# Patient Record
Sex: FEMALE | Race: WHITE | Employment: OTHER | URBAN - METROPOLITAN AREA
[De-identification: names, ages, dates, MRNs, and addresses within clinical notes are randomized per-mention and may not be internally consistent; named-entity substitution may affect disease eponyms.]

---

## 2017-04-18 ENCOUNTER — HOSPITAL ENCOUNTER (OUTPATIENT)
Dept: GENERAL RADIOLOGY | Age: 67
Discharge: HOME | End: 2017-04-18
Attending: ORTHOPAEDIC SURGERY
Payer: COMMERCIAL

## 2017-04-18 DIAGNOSIS — M79.641 PAIN IN RIGHT HAND: ICD-10-CM

## 2017-04-18 PROCEDURE — 73130 X-RAY EXAM OF HAND: CPT

## 2017-04-27 ENCOUNTER — OFFICE VISIT (OUTPATIENT)
Dept: SURGERY | Facility: CLINIC | Age: 67
End: 2017-04-27

## 2017-04-27 DIAGNOSIS — M19.041 PRIMARY OSTEOARTHRITIS OF RIGHT HAND: Primary | ICD-10-CM

## 2017-04-27 PROCEDURE — 99212 OFFICE O/P EST SF 10 MIN: CPT | Performed by: PLASTIC SURGERY

## 2017-04-27 PROCEDURE — 99243 OFF/OP CNSLTJ NEW/EST LOW 30: CPT | Performed by: PLASTIC SURGERY

## 2017-04-27 NOTE — H&P
Virgen Majano is a 73year old female that presents with Patient presents with:  Pain: R Index Finger MCP joint  .     REFERRED BY:  Bill Patricia      Pacemaker: No  Latex Allergy: no  Coumadin: No  Plavix: No  Other anticoagulants: No  Cardiac stents: No every other day.    Disp:  Rfl:        Allergies:     Ambien [Zolpidem]       Insomnia  Antihistamine [Diph*    Insomnia  Dalmane [Flurazepam]    Other (See Comments)    Comment:Severe headaches  Decongestant [Oxyme*    Jittery  Duricef [Cefadroxil]    Itch Drug Use: Not on file    Sexual Activity: Not on file   Not on file  Other Topics Concern    Caffeine Concern Yes    Comment: 4 cups coffee daily    Left Handed No    Right Handed Yes    Currently spends a great deal of time in the sun No    History of tan the patient wishes to proceed with treatment. We cannot place her on anti-inflammatories because of all her allergies. We will start therapy including range of motion and paraffin.     If symptoms are not relieved, I would recommend the patient see a rh

## 2017-05-09 ENCOUNTER — OFFICE VISIT (OUTPATIENT)
Dept: SURGERY | Facility: CLINIC | Age: 67
End: 2017-05-09

## 2017-05-09 DIAGNOSIS — M62.81 DISTAL MUSCLE WEAKNESS: ICD-10-CM

## 2017-05-09 DIAGNOSIS — M25.641 JOINT STIFFNESS OF HAND, RIGHT: Primary | ICD-10-CM

## 2017-05-09 PROCEDURE — 97165 OT EVAL LOW COMPLEX 30 MIN: CPT | Performed by: OCCUPATIONAL THERAPIST

## 2017-05-09 NOTE — PROGRESS NOTES
OCCUPATIONAL THERAPY EVALUATION:   Inez Lugo   [id number]       SUBJECTIVE:    HX of Injury: Progressive right index finger MCP arthritis. Chief Complaint:   RIF MCP pain with activity. .    Precautions: None  Premorbid Functional Status: Independent

## 2017-05-22 ENCOUNTER — HOSPITAL ENCOUNTER (OUTPATIENT)
Dept: MAMMOGRAPHY | Facility: HOSPITAL | Age: 67
Discharge: HOME | End: 2017-05-22
Attending: INTERNAL MEDICINE
Payer: COMMERCIAL

## 2017-05-22 DIAGNOSIS — C50.919 BREAST CANCER (HCC): ICD-10-CM

## 2017-05-22 PROCEDURE — 77066 DX MAMMO INCL CAD BI: CPT | Performed by: INTERNAL MEDICINE

## 2017-05-26 ENCOUNTER — TELEPHONE (OUTPATIENT)
Dept: HEMATOLOGY/ONCOLOGY | Facility: HOSPITAL | Age: 67
End: 2017-05-26

## 2017-05-26 ENCOUNTER — PATIENT MESSAGE (OUTPATIENT)
Dept: HEMATOLOGY/ONCOLOGY | Facility: HOSPITAL | Age: 67
End: 2017-05-26

## 2017-05-26 NOTE — PROGRESS NOTES
As instructed by Dr. Fátima Grimes, let pt know that mammo showed no evidence of malignancy, verified address, mailed copy.

## 2017-05-26 NOTE — TELEPHONE ENCOUNTER
From: Ekaterina Cain  To: Frandy Barker MD  Sent: 5/26/2017 12:06 PM CDT  Subject: Test Results Question    On Monday, May 22nd, I had my mammo done. Has Dr. Alexia Luna had a chance to review? If so, please send me a copy    of test results. Thank you.

## 2017-09-07 ENCOUNTER — OFFICE VISIT (OUTPATIENT)
Dept: HEMATOLOGY/ONCOLOGY | Facility: HOSPITAL | Age: 67
End: 2017-09-07
Attending: INTERNAL MEDICINE
Payer: MEDICARE

## 2017-09-07 VITALS
WEIGHT: 165 LBS | RESPIRATION RATE: 16 BRPM | SYSTOLIC BLOOD PRESSURE: 164 MMHG | HEART RATE: 69 BPM | HEIGHT: 69 IN | DIASTOLIC BLOOD PRESSURE: 74 MMHG | TEMPERATURE: 99 F | BODY MASS INDEX: 24.44 KG/M2

## 2017-09-07 DIAGNOSIS — Z51.81 ENCOUNTER FOR MONITORING ADJUVANT HORMONAL THERAPY: ICD-10-CM

## 2017-09-07 DIAGNOSIS — E28.39 FEMALE HYPOGONADISM DUE TO AROMATASE INHIBITOR THERAPY: ICD-10-CM

## 2017-09-07 DIAGNOSIS — T45.1X5A FEMALE HYPOGONADISM DUE TO AROMATASE INHIBITOR THERAPY: ICD-10-CM

## 2017-09-07 DIAGNOSIS — C50.411 MALIGNANT NEOPLASM OF UPPER-OUTER QUADRANT OF RIGHT BREAST IN FEMALE, ESTROGEN RECEPTOR POSITIVE (HCC): Primary | ICD-10-CM

## 2017-09-07 DIAGNOSIS — Z17.0 MALIGNANT NEOPLASM OF UPPER-OUTER QUADRANT OF RIGHT BREAST IN FEMALE, ESTROGEN RECEPTOR POSITIVE (HCC): Primary | ICD-10-CM

## 2017-09-07 DIAGNOSIS — Z79.899 ENCOUNTER FOR MONITORING ADJUVANT HORMONAL THERAPY: ICD-10-CM

## 2017-09-07 PROCEDURE — G0463 HOSPITAL OUTPT CLINIC VISIT: HCPCS | Performed by: INTERNAL MEDICINE

## 2017-09-07 PROCEDURE — 99214 OFFICE O/P EST MOD 30 MIN: CPT | Performed by: INTERNAL MEDICINE

## 2017-09-07 RX ORDER — ANASTROZOLE 1 MG/1
TABLET ORAL
Qty: 90 TABLET | Refills: 3 | Status: SHIPPED | OUTPATIENT
Start: 2017-09-07 | End: 2018-10-22

## 2017-09-10 DIAGNOSIS — Z17.0 MALIGNANT NEOPLASM OF UPPER-OUTER QUADRANT OF RIGHT BREAST IN FEMALE, ESTROGEN RECEPTOR POSITIVE (HCC): ICD-10-CM

## 2017-09-10 DIAGNOSIS — Z79.899 ENCOUNTER FOR MONITORING ADJUVANT HORMONAL THERAPY: ICD-10-CM

## 2017-09-10 DIAGNOSIS — Z51.81 ENCOUNTER FOR MONITORING ADJUVANT HORMONAL THERAPY: ICD-10-CM

## 2017-09-10 DIAGNOSIS — T45.1X5A FEMALE HYPOGONADISM DUE TO AROMATASE INHIBITOR THERAPY: ICD-10-CM

## 2017-09-10 DIAGNOSIS — E28.39 FEMALE HYPOGONADISM DUE TO AROMATASE INHIBITOR THERAPY: ICD-10-CM

## 2017-09-10 DIAGNOSIS — C50.411 MALIGNANT NEOPLASM OF UPPER-OUTER QUADRANT OF RIGHT BREAST IN FEMALE, ESTROGEN RECEPTOR POSITIVE (HCC): ICD-10-CM

## 2017-09-10 DIAGNOSIS — Z91.89 AT RISK FOR OSTEOPENIA DUE TO HISTORY OF OSTEOPOROSIS: Primary | ICD-10-CM

## 2017-09-11 NOTE — PROGRESS NOTES
RICK Harrison is a 62year old female with a history of a hormone receptor positive, Her 2 silvia negative, pT1c No Mo right breast cancer.   She was treated with a lumpectomy, and sentinel lymph node biopsy, followed by adriamycin and cytoxan x 4 cy Eyes: Positive for visual disturbance. Respiratory: Negative for cough and shortness of breath. Cardiovascular: Negative for chest pain and palpitations.    Gastrointestinal: Negative for abdominal distention, constipation, diarrhea, nausea and vomitin lumpectomy, axillary sampling, Adriamycin & Cytoxan   • Encounter for adjustment or management of vascular access device 2004    venous access; port removal   • Encounter for fitting of portacath 2003    venous access; portacath placement   • H/O arthrosc Constitutional: She is oriented to person, place, and time. She appears well-developed and well-nourished. No distress.    Wt Readings from Last 6 Encounters:  09/07/17 : 74.8 kg (165 lb)  10/05/16 : 75.3 kg (166 lb)  08/04/16 : 73.5 kg (162 lb)  07/01/16 : Patient does not have clinical evidence of local recurrence or metastatic disease. Her bilateral diagnostic mammogram 5/22/17 was negative. She will continue to have annual mammograms. She will continue hormonal therapy with anastrozole 1 mg daily.

## 2017-09-11 NOTE — PROGRESS NOTES
RICK Narvaez is a 62year old female with a history of a hormone receptor positive, Her 2 silvia negative, pT1c No Mo right breast cancer.   She was treated with a lumpectomy, and sentinel lymph node biopsy, followed by adriamycin and cytoxan x 4 cyc Gastrointestinal: Negative for abdominal distention, constipation, diarrhea, nausea and vomiting. Genitourinary: Negative for dysuria and hematuria. Musculoskeletal: Positive for arthralgias.         Knees get tired after playing with grandchildren   Sk venous access; port removal   • Encounter for fitting of portacath 2003    venous access; portacath placement   • H/O arthroscopy 1992    RT wrist   • Removal of pin, plate, miriam, or screw 1992    removal of screws   • S/P T&A (status post tonsillectomy an She is trying to lose weight with a change to a healthier diet and exercise by walking   HENT:   Head: Normocephalic and atraumatic. Mouth/Throat: No oropharyngeal exudate.    Voice unchanged, but hoarse   Eyes: Conjunctivae and EOM are normal. Pupils are She is at increased risk for ongoing loss of bone density with hormonal therapy. The study 9/4/15 was normal.  She is encouraged to continue her weight bearing exercise and healthy diet.         30 total minutes spent with patient >50% of visit was spent i

## 2018-05-24 ENCOUNTER — TELEPHONE (OUTPATIENT)
Dept: HEMATOLOGY/ONCOLOGY | Facility: HOSPITAL | Age: 68
End: 2018-05-24

## 2018-05-24 DIAGNOSIS — Z85.3 PERSONAL HISTORY OF BREAST CANCER: Primary | ICD-10-CM

## 2018-05-24 DIAGNOSIS — Z12.39 SCREENING FOR BREAST CANCER: ICD-10-CM

## 2018-05-24 NOTE — TELEPHONE ENCOUNTER
Geovany Pretty calling asking for Mammo order. Please contact her to let her know order has been placed.  nadia

## 2018-06-19 ENCOUNTER — HOSPITAL ENCOUNTER (OUTPATIENT)
Dept: MAMMOGRAPHY | Facility: HOSPITAL | Age: 68
Discharge: HOME | End: 2018-06-19
Attending: INTERNAL MEDICINE
Payer: MEDICARE

## 2018-06-19 ENCOUNTER — HOSPITAL ENCOUNTER (OUTPATIENT)
Dept: BONE DENSITY | Facility: HOSPITAL | Age: 68
Discharge: HOME | End: 2018-06-19
Attending: INTERNAL MEDICINE
Payer: MEDICARE

## 2018-06-19 DIAGNOSIS — Z85.3 PERSONAL HISTORY OF BREAST CANCER: ICD-10-CM

## 2018-06-19 DIAGNOSIS — Z12.39 SCREENING FOR BREAST CANCER: ICD-10-CM

## 2018-06-19 DIAGNOSIS — Z91.89 AT RISK FOR OSTEOPENIA DUE TO HISTORY OF OSTEOPOROSIS: ICD-10-CM

## 2018-06-19 PROCEDURE — 77063 BREAST TOMOSYNTHESIS BI: CPT | Performed by: INTERNAL MEDICINE

## 2018-06-19 PROCEDURE — 77080 DXA BONE DENSITY AXIAL: CPT | Performed by: INTERNAL MEDICINE

## 2018-06-19 PROCEDURE — 77067 SCR MAMMO BI INCL CAD: CPT | Performed by: INTERNAL MEDICINE

## 2018-06-26 ENCOUNTER — TELEPHONE (OUTPATIENT)
Dept: HEMATOLOGY/ONCOLOGY | Facility: HOSPITAL | Age: 68
End: 2018-06-26

## 2018-06-26 NOTE — TELEPHONE ENCOUNTER
Called patient last night and left a message on her machine. Called again now reviewing the test results of her mammogram and bone density study. Suggested that she call.

## 2018-06-26 NOTE — TELEPHONE ENCOUNTER
Birgit Borges called and said for today only please use this cell phone [phone], Also if you can release the bone density results to my chart

## 2018-06-28 ENCOUNTER — TELEPHONE (OUTPATIENT)
Dept: HEMATOLOGY/ONCOLOGY | Facility: HOSPITAL | Age: 68
End: 2018-06-28

## 2018-06-28 NOTE — TELEPHONE ENCOUNTER
Again no answer at home  Called the cell phone - and actually reached her   She will see Dr. Kimo Newell soon and have labs and evaluation

## 2018-08-23 ENCOUNTER — OFFICE VISIT (OUTPATIENT)
Dept: HEMATOLOGY/ONCOLOGY | Facility: HOSPITAL | Age: 68
End: 2018-08-23
Attending: INTERNAL MEDICINE
Payer: MEDICARE

## 2018-08-23 VITALS
HEIGHT: 69 IN | HEART RATE: 81 BPM | RESPIRATION RATE: 16 BRPM | DIASTOLIC BLOOD PRESSURE: 75 MMHG | WEIGHT: 165 LBS | SYSTOLIC BLOOD PRESSURE: 134 MMHG | TEMPERATURE: 98 F | BODY MASS INDEX: 24.44 KG/M2

## 2018-08-23 DIAGNOSIS — Z51.81 ENCOUNTER FOR MONITORING ADJUVANT HORMONAL THERAPY: ICD-10-CM

## 2018-08-23 DIAGNOSIS — Z12.39 SCREENING FOR BREAST CANCER: ICD-10-CM

## 2018-08-23 DIAGNOSIS — Z79.899 ENCOUNTER FOR MONITORING ADJUVANT HORMONAL THERAPY: ICD-10-CM

## 2018-08-23 DIAGNOSIS — C50.411 MALIGNANT NEOPLASM OF UPPER-OUTER QUADRANT OF RIGHT BREAST IN FEMALE, ESTROGEN RECEPTOR POSITIVE (HCC): Primary | ICD-10-CM

## 2018-08-23 DIAGNOSIS — Z85.3 PERSONAL HISTORY OF BREAST CANCER: ICD-10-CM

## 2018-08-23 DIAGNOSIS — Z17.0 MALIGNANT NEOPLASM OF UPPER-OUTER QUADRANT OF RIGHT BREAST IN FEMALE, ESTROGEN RECEPTOR POSITIVE (HCC): Primary | ICD-10-CM

## 2018-08-23 PROCEDURE — 99214 OFFICE O/P EST MOD 30 MIN: CPT | Performed by: INTERNAL MEDICINE

## 2018-08-24 NOTE — PROGRESS NOTES
RICK Bowser is a 62year old female with a history of a hormone receptor positive, Her 2 silvia negative, pT1c No Mo right breast cancer.   She was treated with a lumpectomy, and sentinel lymph node biopsy, followed by adriamycin and cytoxan x 4 cy Gastrointestinal: Negative for abdominal distention, constipation, diarrhea, nausea and vomiting. Genitourinary: Negative for dysuria and hematuria. Musculoskeletal: Positive for arthralgias.         Knees get tired    Skin: Negative for color change an venous access; port removal   • Encounter for fitting of portacath 2003    venous access; portacath placement   • H/O arthroscopy 1992    RT wrist   • Removal of pin, plate, miriam, or screw 1992    removal of screws   • S/P T&A (status post tonsillectomy an Constitutional: She is oriented to person, place, and time. She appears well-developed and well-nourished. No distress.    Wt Readings from Last 6 Encounters:  09/07/17 : 74.8 kg (165 lb)  10/05/16 : 75.3 kg (166 lb)  08/04/16 : 73.5 kg (162 lb)  07/01/16 : Patient does not have clinical evidence of local recurrence or metastatic disease. Her bilateral diagnostic mammogram 6/9/2018 was negative, category b. She will continue to have annual mammograms.       She will continue hormonal therapy with anastrozole

## 2018-10-23 RX ORDER — ANASTROZOLE 1 MG/1
TABLET ORAL
Qty: 90 TABLET | Refills: 3 | Status: SHIPPED | OUTPATIENT
Start: 2018-10-23 | End: 2019-10-12

## 2019-06-21 ENCOUNTER — HOSPITAL ENCOUNTER (OUTPATIENT)
Dept: MAMMOGRAPHY | Facility: HOSPITAL | Age: 69
Discharge: HOME | End: 2019-06-21
Attending: INTERNAL MEDICINE
Payer: MEDICARE

## 2019-06-21 DIAGNOSIS — Z85.3 PERSONAL HISTORY OF BREAST CANCER: ICD-10-CM

## 2019-06-21 DIAGNOSIS — Z12.39 SCREENING FOR BREAST CANCER: ICD-10-CM

## 2019-06-21 PROCEDURE — 77063 BREAST TOMOSYNTHESIS BI: CPT | Performed by: INTERNAL MEDICINE

## 2019-06-21 PROCEDURE — 77067 SCR MAMMO BI INCL CAD: CPT | Performed by: INTERNAL MEDICINE

## 2019-08-13 ENCOUNTER — OFFICE VISIT (OUTPATIENT)
Dept: OTOLARYNGOLOGY | Facility: CLINIC | Age: 69
End: 2019-08-13
Payer: MEDICARE

## 2019-08-13 VITALS
BODY MASS INDEX: 23.55 KG/M2 | SYSTOLIC BLOOD PRESSURE: 150 MMHG | TEMPERATURE: 98 F | DIASTOLIC BLOOD PRESSURE: 86 MMHG | WEIGHT: 159 LBS | HEIGHT: 69 IN

## 2019-08-13 DIAGNOSIS — M54.2 NECK PAIN: Primary | ICD-10-CM

## 2019-08-13 PROCEDURE — 99203 OFFICE O/P NEW LOW 30 MIN: CPT | Performed by: OTOLARYNGOLOGY

## 2019-08-13 PROCEDURE — G0463 HOSPITAL OUTPT CLINIC VISIT: HCPCS | Performed by: OTOLARYNGOLOGY

## 2019-08-13 NOTE — PROGRESS NOTES
Staci Unger is a 77year old female. Patient presents with:  Mass: right side of neck for 2 weeks     HPI:   She is been experience some pain along the right side of her neck for the last 2 weeks.   She is not having any difficulty eating or drinking or feels well otherwise  GENERAL : denies fever, chills, sweats, weight loss, weight gain  SKIN: denies any unusual skin lesions or rashes  RESPIRATORY: denies shortness of breath with exertion  NEURO: denies headaches    EXAM:   /86   Temp 97.6 °F (36.

## 2019-08-22 ENCOUNTER — OFFICE VISIT (OUTPATIENT)
Dept: HEMATOLOGY/ONCOLOGY | Facility: HOSPITAL | Age: 69
End: 2019-08-22
Attending: INTERNAL MEDICINE
Payer: MEDICARE

## 2019-08-22 VITALS
SYSTOLIC BLOOD PRESSURE: 149 MMHG | DIASTOLIC BLOOD PRESSURE: 84 MMHG | RESPIRATION RATE: 16 BRPM | WEIGHT: 160 LBS | HEART RATE: 96 BPM | OXYGEN SATURATION: 99 % | HEIGHT: 69 IN | TEMPERATURE: 98 F | BODY MASS INDEX: 23.7 KG/M2

## 2019-08-22 DIAGNOSIS — Z17.0 MALIGNANT NEOPLASM OF UPPER-OUTER QUADRANT OF RIGHT BREAST IN FEMALE, ESTROGEN RECEPTOR POSITIVE (HCC): Primary | ICD-10-CM

## 2019-08-22 DIAGNOSIS — Z12.39 SCREENING FOR BREAST CANCER: ICD-10-CM

## 2019-08-22 DIAGNOSIS — Z79.899 ENCOUNTER FOR MONITORING ADJUVANT HORMONAL THERAPY: ICD-10-CM

## 2019-08-22 DIAGNOSIS — C50.411 MALIGNANT NEOPLASM OF UPPER-OUTER QUADRANT OF RIGHT BREAST IN FEMALE, ESTROGEN RECEPTOR POSITIVE (HCC): Primary | ICD-10-CM

## 2019-08-22 DIAGNOSIS — Z51.81 ENCOUNTER FOR MONITORING ADJUVANT HORMONAL THERAPY: ICD-10-CM

## 2019-08-22 PROCEDURE — 99214 OFFICE O/P EST MOD 30 MIN: CPT | Performed by: INTERNAL MEDICINE

## 2019-08-22 NOTE — PROGRESS NOTES
HPI   8/22/2019    Jeremi Browning is a 62year old female with a history of a hormone receptor positive, Her 2 silvia negative, pT1c N0 M0 right breast cancer.   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  Negative for hearing Decongestant [Oxyme*    JITTERY  Duricef [Cefadroxil]    ITCHING    Comment:dermatitis  Penicillins             HIVES, RASH  Codeine                 ANAPHYLAXIS  Demerol [Meperidine]    OTHER (SEE COMMENTS)    Comment:Therapeutic failure  Ibuprofen Non-medical: Not on file    Tobacco Use      Smoking status: Former Smoker        Packs/day: 0.50        Years: 38.00        Pack years: 19        Types: Cigarettes      Smokeless tobacco: Former User        Quit date: 4/23/2008    Substance and Sexu Social History Narrative      Not on file    Family History   Problem Relation Age of Onset   • Cancer Father         lung; was exposed to asbestos   • Colon Cancer Brother    • Dementia Mother         ECF in [place]   • Prostate Cancer Brother         l Breast cancer of upper-outer quadrant of right female breast [facility])    Staging form: Breast, AJCC V7    Pathologic: Stage IA (T1c, N0, cM0) - Signed by Ean Guerin on 9/3/2015    Patient does not have clinical evidence of local recurrence or metastatic

## 2019-10-14 RX ORDER — ANASTROZOLE 1 MG/1
TABLET ORAL
Qty: 90 TABLET | Refills: 3 | Status: SHIPPED | OUTPATIENT
Start: 2019-10-14 | End: 2021-08-27 | Stop reason: ALTCHOICE

## 2020-06-24 ENCOUNTER — HOSPITAL ENCOUNTER (OUTPATIENT)
Dept: MAMMOGRAPHY | Facility: HOSPITAL | Age: 70
Discharge: HOME | End: 2020-06-24
Attending: INTERNAL MEDICINE
Payer: MEDICARE

## 2020-06-24 DIAGNOSIS — Z12.31 ENCOUNTER FOR SCREENING MAMMOGRAM FOR MALIGNANT NEOPLASM OF BREAST: ICD-10-CM

## 2020-06-24 PROCEDURE — 77063 BREAST TOMOSYNTHESIS BI: CPT | Performed by: INTERNAL MEDICINE

## 2020-06-24 PROCEDURE — 77067 SCR MAMMO BI INCL CAD: CPT | Performed by: INTERNAL MEDICINE

## 2020-08-27 ENCOUNTER — OFFICE VISIT (OUTPATIENT)
Dept: HEMATOLOGY/ONCOLOGY | Facility: HOSPITAL | Age: 70
End: 2020-08-27
Attending: INTERNAL MEDICINE
Payer: MEDICARE

## 2020-08-27 VITALS
TEMPERATURE: 98 F | WEIGHT: 163 LBS | OXYGEN SATURATION: 99 % | HEART RATE: 95 BPM | RESPIRATION RATE: 16 BRPM | SYSTOLIC BLOOD PRESSURE: 153 MMHG | DIASTOLIC BLOOD PRESSURE: 87 MMHG | HEIGHT: 69 IN | BODY MASS INDEX: 24.14 KG/M2

## 2020-08-27 DIAGNOSIS — Z17.0 MALIGNANT NEOPLASM OF UPPER-OUTER QUADRANT OF RIGHT BREAST IN FEMALE, ESTROGEN RECEPTOR POSITIVE (HCC): Primary | ICD-10-CM

## 2020-08-27 DIAGNOSIS — Z51.81 ENCOUNTER FOR MONITORING ADJUVANT HORMONAL THERAPY: ICD-10-CM

## 2020-08-27 DIAGNOSIS — C50.411 MALIGNANT NEOPLASM OF UPPER-OUTER QUADRANT OF RIGHT BREAST IN FEMALE, ESTROGEN RECEPTOR POSITIVE (HCC): Primary | ICD-10-CM

## 2020-08-27 DIAGNOSIS — Z78.0 ASYMPTOMATIC MENOPAUSE: ICD-10-CM

## 2020-08-27 DIAGNOSIS — Z79.899 ENCOUNTER FOR MONITORING ADJUVANT HORMONAL THERAPY: ICD-10-CM

## 2020-08-27 PROCEDURE — 99214 OFFICE O/P EST MOD 30 MIN: CPT | Performed by: INTERNAL MEDICINE

## 2020-08-27 NOTE — PROGRESS NOTES
HPI   8/27/2020    Karli Powers is a 72year old female with a history of a hormone receptor positive, Her 2 silvia negative, pT1c N0 M0 right breast cancer.   She was treated with a lumpectomy, and sentinel lymph node biopsy, followed by adriamycin and cyto for unexpected weight change. Negative for chills and fever. 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    CHERISE: Positive for dental problem and Comment:Severe headaches  Decongestant [Oxyme*    JITTERY  Duricef [Cefadroxil]    ITCHING    Comment:dermatitis  Penicillins             HIVES, RASH  Codeine                 ANAPHYLAXIS  Demerol [Meperidine]    OTHER (SEE COMMENTS)    Comment:Therapeutic Medical: Not on file        Non-medical: Not on file    Tobacco Use      Smoking status: Former Smoker        Packs/day: 0.50        Years: 38.00        Pack years: 19        Types: Cigarettes      Smokeless tobacco: Former User        Quit date: 4/23/2008 Social History Narrative      Not on file    Family History   Problem Relation Age of Onset   • Cancer Father         lung; was exposed to asbestos   • Colon Cancer Brother    • Dementia Mother         ECF [place]   • Prostate Cancer Brother         beht cancer of upper-outer quadrant of right female breast [facility])    Staging form: Breast, AJCC V7    Pathologic: Stage IA (T1c, N0, cM0) - Signed by Sarath Garces on 9/3/2015    Patient does not have clinical evidence of local recurrence or metastatic disease

## 2020-09-16 ENCOUNTER — TELEPHONE (OUTPATIENT)
Dept: RADIATION ONCOLOGY | Facility: HOSPITAL | Age: 70
End: 2020-09-16

## 2020-09-16 ENCOUNTER — TELEPHONE (OUTPATIENT)
Dept: HEMATOLOGY/ONCOLOGY | Facility: HOSPITAL | Age: 70
End: 2020-09-16

## 2020-09-16 ENCOUNTER — HOSPITAL ENCOUNTER (OUTPATIENT)
Dept: BONE DENSITY | Facility: HOSPITAL | Age: 70
Discharge: HOME | End: 2020-09-16
Attending: INTERNAL MEDICINE
Payer: MEDICARE

## 2020-09-16 DIAGNOSIS — Z79.811 ENCOUNTER FOR MONITORING AROMATASE INHIBITOR THERAPY: ICD-10-CM

## 2020-09-16 DIAGNOSIS — Z51.81 ENCOUNTER FOR MONITORING AROMATASE INHIBITOR THERAPY: ICD-10-CM

## 2020-09-16 PROCEDURE — 77080 DXA BONE DENSITY AXIAL: CPT | Performed by: INTERNAL MEDICINE

## 2020-09-17 ENCOUNTER — TELEPHONE (OUTPATIENT)
Dept: HEMATOLOGY/ONCOLOGY | Facility: HOSPITAL | Age: 70
End: 2020-09-17

## 2020-09-17 NOTE — TELEPHONE ENCOUNTER
Call about bone density - reviewed report   1. Findings in the left femoral neck suggest osteopenia, and there may be increased fracture risk. There has been decrease in the BMD by 5.8% from previous study.   Findings in the total left hip are in the soren

## 2021-06-28 ENCOUNTER — HOSPITAL ENCOUNTER (OUTPATIENT)
Dept: MAMMOGRAPHY | Facility: HOSPITAL | Age: 71
Discharge: HOME | End: 2021-06-28
Attending: INTERNAL MEDICINE
Payer: MEDICARE

## 2021-06-28 DIAGNOSIS — Z12.31 ENCOUNTER FOR SCREENING MAMMOGRAM FOR MALIGNANT NEOPLASM OF BREAST: ICD-10-CM

## 2021-06-28 PROCEDURE — 77067 SCR MAMMO BI INCL CAD: CPT | Performed by: INTERNAL MEDICINE

## 2021-06-28 PROCEDURE — 77063 BREAST TOMOSYNTHESIS BI: CPT | Performed by: INTERNAL MEDICINE

## 2021-08-27 ENCOUNTER — OFFICE VISIT (OUTPATIENT)
Dept: HEMATOLOGY/ONCOLOGY | Facility: HOSPITAL | Age: 71
End: 2021-08-27
Attending: INTERNAL MEDICINE
Payer: MEDICARE

## 2021-08-27 VITALS
WEIGHT: 160 LBS | SYSTOLIC BLOOD PRESSURE: 153 MMHG | RESPIRATION RATE: 16 BRPM | OXYGEN SATURATION: 99 % | DIASTOLIC BLOOD PRESSURE: 79 MMHG | TEMPERATURE: 98 F | BODY MASS INDEX: 23.7 KG/M2 | HEART RATE: 93 BPM | HEIGHT: 69 IN

## 2021-08-27 DIAGNOSIS — M85.852 OSTEOPENIA OF NECK OF LEFT FEMUR: ICD-10-CM

## 2021-08-27 DIAGNOSIS — Z12.31 ENCOUNTER FOR SCREENING MAMMOGRAM FOR MALIGNANT NEOPLASM OF BREAST: ICD-10-CM

## 2021-08-27 DIAGNOSIS — Z17.0 MALIGNANT NEOPLASM OF UPPER-OUTER QUADRANT OF RIGHT BREAST IN FEMALE, ESTROGEN RECEPTOR POSITIVE (HCC): Primary | ICD-10-CM

## 2021-08-27 DIAGNOSIS — C50.411 MALIGNANT NEOPLASM OF UPPER-OUTER QUADRANT OF RIGHT BREAST IN FEMALE, ESTROGEN RECEPTOR POSITIVE (HCC): Primary | ICD-10-CM

## 2021-08-27 PROBLEM — M85.859 OSTEOPENIA OF NECK OF FEMUR: Status: ACTIVE | Noted: 2021-08-27

## 2021-08-27 PROCEDURE — 99213 OFFICE O/P EST LOW 20 MIN: CPT | Performed by: INTERNAL MEDICINE

## 2021-08-27 NOTE — PROGRESS NOTES
HPI   8/27/2021    Cleo Rivera is a 80year old female with a history of a hormone receptor positive, Her 2 silvia negative, pT1c N0 M0 right breast cancer diagnosed in April 2003.   She was treated with a lumpectomy, and sentinel lymph node biopsy, followe low grade. ER 84%, NE 71%, HER-2/silvia negative.      5/14/2003 Cancer Staged    pT1c N0 M0, Stage I     6/20/2003 - 8/22/2003 Chemotherapy    Adriamycin 100 mg and cytoxan 1000 mg x 4 cycles     9/19/2003 - 9/19/2020 Chemotherapy    Aromasin started - plummer mouth daily. • Calcium Carbonate-Vitamin D (CALCIUM-VITAMIN D) 600-200 MG-UNIT Oral Cap Take by mouth daily. 1200 mg every other day      • omega-3 fatty acids (FISH OIL) 1000 MG Oral Cap Take 1,000 mg by mouth every other day.        • Vitamin D3 (MKIAELA COLONOSCOPY  2004   • LARYNGOSCOPY,DIRCT,OP SCOPE,FB REMV  7-13-16   • LUMPECTOMY RIGHT Right 2003   • RADIATION RIGHT Right 2003     Social History    Socioeconomic History      Marital status:       Spouse name: Not on file      Number of children Transportation Needs:       Lack of Transportation (Medical):       Lack of Transportation (Non-Medical):   Physical Activity:       Days of Exercise per Week:       Minutes of Exercise per Session:   Stress:       Feeling of Stress :   Social Connection Effort: Pulmonary effort is normal. No respiratory distress. Breath sounds: Normal breath sounds. No rales. Chest:      Chest wall: No tenderness. Abdominal:      General: Bowel sounds are normal.      Palpations: Abdomen is soft.  There is no 2022    She is encouraged to continue her weight bearing exercise and healthy diet. She is encouraged to choose a primary care physician. 30 total minutes spent with patient >50% of visit was spent in counseling and coordination of care.      RESUL

## 2021-08-28 ENCOUNTER — TELEPHONE (OUTPATIENT)
Dept: HEMATOLOGY/ONCOLOGY | Facility: HOSPITAL | Age: 71
End: 2021-08-28

## 2021-08-28 NOTE — TELEPHONE ENCOUNTER
Call to home number - hang up and no answer   Was calling to document vitamin D supplement   She indicated that she did not have a primary MD - I was going to suggest additional options

## 2021-08-29 ENCOUNTER — TELEPHONE (OUTPATIENT)
Dept: HEMATOLOGY/ONCOLOGY | Facility: HOSPITAL | Age: 71
End: 2021-08-29

## 2021-08-30 ENCOUNTER — TELEPHONE (OUTPATIENT)
Dept: HEMATOLOGY/ONCOLOGY | Facility: HOSPITAL | Age: 71
End: 2021-08-30

## 2021-08-30 NOTE — TELEPHONE ENCOUNTER
Called patient and suggested 2000 international units of 25 hydroxy vitamin D  She does agree to have a primary care physician and will contact Dr. Red Greer or Dr. German Smyth for up an appointment

## 2021-08-30 NOTE — TELEPHONE ENCOUNTER
Patient calling Dr Maximo Freitas back  With Vitamin D Info    Takes Centrum Silver    25mcg of D3       Calcium Plus D3         20mcg

## 2022-07-07 ENCOUNTER — HOSPITAL ENCOUNTER (OUTPATIENT)
Dept: MAMMOGRAPHY | Facility: HOSPITAL | Age: 72
Discharge: HOME | End: 2022-07-07
Attending: INTERNAL MEDICINE
Payer: MEDICARE

## 2022-07-07 DIAGNOSIS — Z17.0 MALIGNANT NEOPLASM OF UPPER-OUTER QUADRANT OF RIGHT BREAST IN FEMALE, ESTROGEN RECEPTOR POSITIVE (HCC): ICD-10-CM

## 2022-07-07 DIAGNOSIS — M85.852 OSTEOPENIA OF NECK OF LEFT FEMUR: ICD-10-CM

## 2022-07-07 DIAGNOSIS — C50.411 MALIGNANT NEOPLASM OF UPPER-OUTER QUADRANT OF RIGHT BREAST IN FEMALE, ESTROGEN RECEPTOR POSITIVE (HCC): ICD-10-CM

## 2022-07-07 PROCEDURE — 77063 BREAST TOMOSYNTHESIS BI: CPT | Performed by: INTERNAL MEDICINE

## 2022-07-07 PROCEDURE — 77067 SCR MAMMO BI INCL CAD: CPT | Performed by: INTERNAL MEDICINE

## 2022-08-29 ENCOUNTER — APPOINTMENT (OUTPATIENT)
Dept: HEMATOLOGY/ONCOLOGY | Facility: HOSPITAL | Age: 72
End: 2022-08-29
Attending: INTERNAL MEDICINE
Payer: MEDICARE

## 2022-09-12 ENCOUNTER — OFFICE VISIT (OUTPATIENT)
Dept: HEMATOLOGY/ONCOLOGY | Facility: HOSPITAL | Age: 72
End: 2022-09-12
Attending: INTERNAL MEDICINE
Payer: MEDICARE

## 2022-09-12 VITALS
BODY MASS INDEX: 22.46 KG/M2 | RESPIRATION RATE: 18 BRPM | WEIGHT: 151.63 LBS | OXYGEN SATURATION: 98 % | HEART RATE: 101 BPM | SYSTOLIC BLOOD PRESSURE: 165 MMHG | DIASTOLIC BLOOD PRESSURE: 91 MMHG | TEMPERATURE: 99 F | HEIGHT: 69 IN

## 2022-09-12 DIAGNOSIS — M85.80 OSTEOPENIA AFTER MENOPAUSE: ICD-10-CM

## 2022-09-12 DIAGNOSIS — Z78.0 OSTEOPENIA AFTER MENOPAUSE: ICD-10-CM

## 2022-09-12 DIAGNOSIS — Z08 ENCOUNTER FOR FOLLOW-UP SURVEILLANCE OF BREAST CANCER: Primary | ICD-10-CM

## 2022-09-12 DIAGNOSIS — Z12.31 ENCOUNTER FOR SCREENING MAMMOGRAM FOR MALIGNANT NEOPLASM OF BREAST: ICD-10-CM

## 2022-09-12 DIAGNOSIS — Z85.3 ENCOUNTER FOR FOLLOW-UP SURVEILLANCE OF BREAST CANCER: Primary | ICD-10-CM

## 2022-09-12 DIAGNOSIS — Z85.3 HISTORY OF RIGHT BREAST CANCER: ICD-10-CM

## 2022-09-12 PROCEDURE — 99211 OFF/OP EST MAY X REQ PHY/QHP: CPT

## 2022-12-16 ENCOUNTER — HOSPITAL ENCOUNTER (OUTPATIENT)
Dept: BONE DENSITY | Facility: HOSPITAL | Age: 72
Discharge: HOME | End: 2022-12-16
Attending: INTERNAL MEDICINE
Payer: MEDICARE

## 2022-12-16 DIAGNOSIS — M85.80 OSTEOPENIA AFTER MENOPAUSE: ICD-10-CM

## 2022-12-16 DIAGNOSIS — Z78.0 OSTEOPENIA AFTER MENOPAUSE: ICD-10-CM

## 2022-12-16 PROCEDURE — 77080 DXA BONE DENSITY AXIAL: CPT | Performed by: INTERNAL MEDICINE

## 2022-12-23 DIAGNOSIS — Z78.0 OSTEOPENIA AFTER MENOPAUSE: Primary | ICD-10-CM

## 2022-12-23 DIAGNOSIS — M85.80 OSTEOPENIA AFTER MENOPAUSE: Primary | ICD-10-CM

## 2023-01-11 ENCOUNTER — LAB ENCOUNTER (OUTPATIENT)
Dept: LAB | Facility: HOSPITAL | Age: 73
End: 2023-01-11
Attending: INTERNAL MEDICINE
Payer: MEDICARE

## 2023-01-11 DIAGNOSIS — M85.80 OSTEOPENIA AFTER MENOPAUSE: ICD-10-CM

## 2023-01-11 DIAGNOSIS — Z78.0 OSTEOPENIA AFTER MENOPAUSE: ICD-10-CM

## 2023-01-11 LAB — VIT D+METAB SERPL-MCNC: 73.8 NG/ML (ref 30–100)

## 2023-01-11 PROCEDURE — 36415 COLL VENOUS BLD VENIPUNCTURE: CPT

## 2023-01-11 PROCEDURE — 82306 VITAMIN D 25 HYDROXY: CPT

## 2023-05-02 ENCOUNTER — OFFICE VISIT (OUTPATIENT)
Dept: INTERNAL MEDICINE CLINIC | Facility: CLINIC | Age: 73
End: 2023-05-02

## 2023-05-02 VITALS
BODY MASS INDEX: 23.28 KG/M2 | HEART RATE: 100 BPM | WEIGHT: 157.19 LBS | OXYGEN SATURATION: 100 % | DIASTOLIC BLOOD PRESSURE: 80 MMHG | HEIGHT: 69 IN | SYSTOLIC BLOOD PRESSURE: 134 MMHG

## 2023-05-02 DIAGNOSIS — C50.411 MALIGNANT NEOPLASM OF UPPER-OUTER QUADRANT OF RIGHT BREAST IN FEMALE, ESTROGEN RECEPTOR POSITIVE (HCC): Primary | ICD-10-CM

## 2023-05-02 DIAGNOSIS — R45.82 WORRIES: ICD-10-CM

## 2023-05-02 DIAGNOSIS — Z17.0 MALIGNANT NEOPLASM OF UPPER-OUTER QUADRANT OF RIGHT BREAST IN FEMALE, ESTROGEN RECEPTOR POSITIVE (HCC): Primary | ICD-10-CM

## 2023-05-02 DIAGNOSIS — Z82.49 FAMILY HISTORY OF HEART DISEASE: ICD-10-CM

## 2023-05-02 DIAGNOSIS — Z13.6 ENCOUNTER FOR SCREENING FOR CARDIOVASCULAR DISORDERS: ICD-10-CM

## 2023-05-02 PROCEDURE — 99204 OFFICE O/P NEW MOD 45 MIN: CPT | Performed by: INTERNAL MEDICINE

## 2023-05-02 PROCEDURE — 1126F AMNT PAIN NOTED NONE PRSNT: CPT | Performed by: INTERNAL MEDICINE

## 2023-05-02 RX ORDER — CHOLECALCIFEROL (VITAMIN D3) 125 MCG
CAPSULE ORAL
COMMUNITY

## 2023-05-12 ENCOUNTER — LAB ENCOUNTER (OUTPATIENT)
Dept: LAB | Facility: HOSPITAL | Age: 73
End: 2023-05-12
Attending: INTERNAL MEDICINE
Payer: MEDICARE

## 2023-05-12 DIAGNOSIS — D64.9 ANEMIA, UNSPECIFIED TYPE: ICD-10-CM

## 2023-05-12 DIAGNOSIS — C50.411 MALIGNANT NEOPLASM OF UPPER-OUTER QUADRANT OF RIGHT BREAST IN FEMALE, ESTROGEN RECEPTOR POSITIVE (HCC): ICD-10-CM

## 2023-05-12 DIAGNOSIS — Z82.49 FAMILY HISTORY OF HEART DISEASE: ICD-10-CM

## 2023-05-12 DIAGNOSIS — R45.82 WORRIES: ICD-10-CM

## 2023-05-12 DIAGNOSIS — Z13.6 ENCOUNTER FOR SCREENING FOR CARDIOVASCULAR DISORDERS: ICD-10-CM

## 2023-05-12 DIAGNOSIS — Z17.0 MALIGNANT NEOPLASM OF UPPER-OUTER QUADRANT OF RIGHT BREAST IN FEMALE, ESTROGEN RECEPTOR POSITIVE (HCC): ICD-10-CM

## 2023-05-12 LAB
ALBUMIN SERPL-MCNC: 3.4 G/DL (ref 3.4–5)
ALBUMIN/GLOB SERPL: 1.1 {RATIO} (ref 1–2)
ALP LIVER SERPL-CCNC: 64 U/L
ALT SERPL-CCNC: 16 U/L
ANION GAP SERPL CALC-SCNC: 5 MMOL/L (ref 0–18)
AST SERPL-CCNC: 16 U/L (ref 15–37)
BILIRUB SERPL-MCNC: 0.3 MG/DL (ref 0.1–2)
BUN BLD-MCNC: 28 MG/DL (ref 7–18)
BUN/CREAT SERPL: 35.9 (ref 10–20)
CALCIUM BLD-MCNC: 9.4 MG/DL (ref 8.5–10.1)
CHLORIDE SERPL-SCNC: 111 MMOL/L (ref 98–112)
CHOLEST SERPL-MCNC: 161 MG/DL (ref ?–200)
CO2 SERPL-SCNC: 25 MMOL/L (ref 21–32)
CREAT BLD-MCNC: 0.78 MG/DL
DEPRECATED RDW RBC AUTO: 45.1 FL (ref 35.1–46.3)
ERYTHROCYTE [DISTWIDTH] IN BLOOD BY AUTOMATED COUNT: 16.8 % (ref 11–15)
FASTING PATIENT LIPID ANSWER: YES
FASTING STATUS PATIENT QL REPORTED: YES
GFR SERPLBLD BASED ON 1.73 SQ M-ARVRAT: 81 ML/MIN/1.73M2 (ref 60–?)
GLOBULIN PLAS-MCNC: 3.2 G/DL (ref 2.8–4.4)
GLUCOSE BLD-MCNC: 96 MG/DL (ref 70–99)
HCT VFR BLD AUTO: 24.8 %
HDLC SERPL-MCNC: 77 MG/DL (ref 40–59)
HGB BLD-MCNC: 7.1 G/DL
LDLC SERPL CALC-MCNC: 73 MG/DL (ref ?–100)
MCH RBC QN AUTO: 21.5 PG (ref 26–34)
MCHC RBC AUTO-ENTMCNC: 28.6 G/DL (ref 31–37)
MCV RBC AUTO: 74.9 FL
NONHDLC SERPL-MCNC: 84 MG/DL (ref ?–130)
OSMOLALITY SERPL CALC.SUM OF ELEC: 297 MOSM/KG (ref 275–295)
PLATELET # BLD AUTO: 418 10(3)UL (ref 150–450)
POTASSIUM SERPL-SCNC: 4.1 MMOL/L (ref 3.5–5.1)
PROT SERPL-MCNC: 6.6 G/DL (ref 6.4–8.2)
RBC # BLD AUTO: 3.31 X10(6)UL
SODIUM SERPL-SCNC: 141 MMOL/L (ref 136–145)
TRIGL SERPL-MCNC: 54 MG/DL (ref 30–149)
TSI SER-ACNC: 1.44 MIU/ML (ref 0.36–3.74)
VLDLC SERPL CALC-MCNC: 8 MG/DL (ref 0–30)
WBC # BLD AUTO: 5.4 X10(3) UL (ref 4–11)

## 2023-05-12 PROCEDURE — 80053 COMPREHEN METABOLIC PANEL: CPT

## 2023-05-12 PROCEDURE — 85027 COMPLETE CBC AUTOMATED: CPT

## 2023-05-12 PROCEDURE — 80061 LIPID PANEL: CPT

## 2023-05-12 PROCEDURE — 83540 ASSAY OF IRON: CPT

## 2023-05-12 PROCEDURE — 82728 ASSAY OF FERRITIN: CPT

## 2023-05-12 PROCEDURE — 84466 ASSAY OF TRANSFERRIN: CPT

## 2023-05-12 PROCEDURE — 36415 COLL VENOUS BLD VENIPUNCTURE: CPT

## 2023-05-12 PROCEDURE — 84443 ASSAY THYROID STIM HORMONE: CPT

## 2023-05-15 DIAGNOSIS — D64.9 ANEMIA, UNSPECIFIED TYPE: Primary | ICD-10-CM

## 2023-05-15 DIAGNOSIS — Z12.11 COLON CANCER SCREENING: ICD-10-CM

## 2023-05-15 LAB
DEPRECATED HBV CORE AB SER IA-ACNC: 4.1 NG/ML
IRON SATN MFR SERPL: 3 %
IRON SERPL-MCNC: 12 UG/DL
TIBC SERPL-MCNC: 435 UG/DL (ref 240–450)
TRANSFERRIN SERPL-MCNC: 292 MG/DL (ref 200–360)

## 2023-05-16 ENCOUNTER — TELEPHONE (OUTPATIENT)
Dept: HEMATOLOGY/ONCOLOGY | Facility: HOSPITAL | Age: 73
End: 2023-05-16

## 2023-05-16 NOTE — TELEPHONE ENCOUNTER
LVMTJANUARY to schedule a F/U on 5/18/23 at 1200 PM with , referred by Dr. Hieu Pascal, Called 5/16/23

## 2023-05-18 ENCOUNTER — OFFICE VISIT (OUTPATIENT)
Dept: HEMATOLOGY/ONCOLOGY | Facility: HOSPITAL | Age: 73
End: 2023-05-18
Attending: INTERNAL MEDICINE
Payer: MEDICARE

## 2023-05-18 ENCOUNTER — TELEPHONE (OUTPATIENT)
Dept: GASTROENTEROLOGY | Facility: CLINIC | Age: 73
End: 2023-05-18

## 2023-05-18 VITALS
HEIGHT: 68 IN | DIASTOLIC BLOOD PRESSURE: 54 MMHG | BODY MASS INDEX: 23.79 KG/M2 | OXYGEN SATURATION: 100 % | TEMPERATURE: 98 F | SYSTOLIC BLOOD PRESSURE: 143 MMHG | HEART RATE: 75 BPM | RESPIRATION RATE: 18 BRPM | WEIGHT: 157 LBS

## 2023-05-18 DIAGNOSIS — K90.9 IRON MALABSORPTION: ICD-10-CM

## 2023-05-18 DIAGNOSIS — Z85.3 ENCOUNTER FOR FOLLOW-UP SURVEILLANCE OF BREAST CANCER: ICD-10-CM

## 2023-05-18 DIAGNOSIS — M85.80 OSTEOPENIA AFTER MENOPAUSE: ICD-10-CM

## 2023-05-18 DIAGNOSIS — Z85.3 HISTORY OF RIGHT BREAST CANCER: Primary | ICD-10-CM

## 2023-05-18 DIAGNOSIS — D50.9 IRON DEFICIENCY ANEMIA, UNSPECIFIED IRON DEFICIENCY ANEMIA TYPE: Primary | ICD-10-CM

## 2023-05-18 DIAGNOSIS — Z78.0 OSTEOPENIA AFTER MENOPAUSE: ICD-10-CM

## 2023-05-18 DIAGNOSIS — D50.9 IRON DEFICIENCY ANEMIA, UNSPECIFIED IRON DEFICIENCY ANEMIA TYPE: ICD-10-CM

## 2023-05-18 DIAGNOSIS — Z08 ENCOUNTER FOR FOLLOW-UP SURVEILLANCE OF BREAST CANCER: ICD-10-CM

## 2023-05-18 DIAGNOSIS — Z12.31 ENCOUNTER FOR SCREENING MAMMOGRAM FOR MALIGNANT NEOPLASM OF BREAST: ICD-10-CM

## 2023-05-18 PROCEDURE — 99215 OFFICE O/P EST HI 40 MIN: CPT | Performed by: INTERNAL MEDICINE

## 2023-05-18 RX ORDER — POLYETHYLENE GLYCOL 3350, SODIUM SULFATE ANHYDROUS, SODIUM BICARBONATE, SODIUM CHLORIDE, POTASSIUM CHLORIDE 236; 22.74; 6.74; 5.86; 2.97 G/4L; G/4L; G/4L; G/4L; G/4L
4 POWDER, FOR SOLUTION ORAL ONCE
Qty: 1 EACH | Refills: 0 | Status: SHIPPED | OUTPATIENT
Start: 2023-05-18 | End: 2023-05-18

## 2023-05-18 RX ORDER — FOLIC ACID 1 MG/1
1 TABLET ORAL DAILY
Qty: 90 TABLET | Refills: 1 | Status: SHIPPED | OUTPATIENT
Start: 2023-05-18

## 2023-05-18 RX ORDER — ACETAMINOPHEN 500 MG
TABLET ORAL DAILY
COMMUNITY

## 2023-05-18 NOTE — TELEPHONE ENCOUNTER
Hey everybody. A patient just canceled an Endo procedure for Tuesday, 5/23/2024. Could we please use that cancellation spot for MsJackelyn Dove?    - cb

## 2023-05-18 NOTE — TELEPHONE ENCOUNTER
Scheduled for:  Colonoscopy Lori [address]  Provider Name:  Parul Coffey  Date:  05/23/2023  Location:  [facility]  Sedation:  mac  Time:  8:45am (pt is aware to arrive at 7:45am    Prep:  golytely  Meds/Allergies Reconciled?:  yes    Diagnosis with codes:  EDITH D50.9  Was patient informed to call insurance with codes (Y/N):  yes     Referral sent?:  Referral was sent at the time of electronic surgical scheduling. [facility] or [address] [place] [address] notified?:  I sent an electronic request to Endo Scheduling and received a confirmation today. Medication Orders:  none  Misc Orders:  none     Further instructions given by staff:  I discussed the prep instructions with the patient which she verbally understood and is aware that I will send the instructions today. via [employer]

## 2023-05-18 NOTE — TELEPHONE ENCOUNTER
Dr. Gianna Hedrick contacted me regarding this patient with history of breast cancer, now under surveillance. Recent labs 5/12/2023 show severe iron deficiency anemia Hgb 7.1g mcv 74.9. Last CBC here was normal in 2013. Apparently she underwent a colonoscopy examination in 2009. Not previously seen by us, unclear whether 2009 colonoscopy exam was performed. No recent CBC or labs under \"Care Everywhere\" tab so far for this patient.

## 2023-05-19 ENCOUNTER — PATIENT MESSAGE (OUTPATIENT)
Dept: INTERNAL MEDICINE CLINIC | Facility: CLINIC | Age: 73
End: 2023-05-19

## 2023-05-19 NOTE — TELEPHONE ENCOUNTER
From: Paige Rizo  To: Yolette Lorenzo MD  Sent: 5/19/2023 9:02 AM CDT  Subject: Colonoscopy    Kisha Null RN/Dr Sparkle Abernathy    I am scheduled with Dr Maricarmen Narvaez for 5/23/23  at 7:45 am for upper and lower GI. Dr Brian Sr had a cancellation  for Tuesday so I can be seen much earlier than  expected.     Servando Recinos

## 2023-05-23 ENCOUNTER — HOSPITAL ENCOUNTER (OUTPATIENT)
Facility: HOSPITAL | Age: 73
Setting detail: HOSPITAL OUTPATIENT SURGERY
Discharge: HOME | End: 2023-05-23
Attending: INTERNAL MEDICINE | Admitting: INTERNAL MEDICINE
Payer: MEDICARE

## 2023-05-23 ENCOUNTER — ANESTHESIA (OUTPATIENT)
Dept: ENDOSCOPY | Facility: HOSPITAL | Age: 73
End: 2023-05-23
Payer: MEDICARE

## 2023-05-23 ENCOUNTER — ANESTHESIA EVENT (OUTPATIENT)
Dept: ENDOSCOPY | Facility: HOSPITAL | Age: 73
End: 2023-05-23
Payer: MEDICARE

## 2023-05-23 VITALS
DIASTOLIC BLOOD PRESSURE: 93 MMHG | RESPIRATION RATE: 18 BRPM | HEART RATE: 76 BPM | BODY MASS INDEX: 23.79 KG/M2 | OXYGEN SATURATION: 100 % | SYSTOLIC BLOOD PRESSURE: 161 MMHG | WEIGHT: 157 LBS | HEIGHT: 68 IN

## 2023-05-23 DIAGNOSIS — D50.9 IRON DEFICIENCY ANEMIA, UNSPECIFIED IRON DEFICIENCY ANEMIA TYPE: ICD-10-CM

## 2023-05-23 PROCEDURE — 0DB38ZX EXCISION OF LOWER ESOPHAGUS, VIA NATURAL OR ARTIFICIAL OPENING ENDOSCOPIC, DIAGNOSTIC: ICD-10-PCS | Performed by: INTERNAL MEDICINE

## 2023-05-23 PROCEDURE — 0D5H8ZZ DESTRUCTION OF CECUM, VIA NATURAL OR ARTIFICIAL OPENING ENDOSCOPIC: ICD-10-PCS | Performed by: INTERNAL MEDICINE

## 2023-05-23 PROCEDURE — 43239 EGD BIOPSY SINGLE/MULTIPLE: CPT | Performed by: INTERNAL MEDICINE

## 2023-05-23 PROCEDURE — 45388 COLONOSCOPY W/ABLATION: CPT | Performed by: INTERNAL MEDICINE

## 2023-05-23 PROCEDURE — 3E0H8GC INTRODUCTION OF OTHER THERAPEUTIC SUBSTANCE INTO LOWER GI, VIA NATURAL OR ARTIFICIAL OPENING ENDOSCOPIC: ICD-10-PCS | Performed by: INTERNAL MEDICINE

## 2023-05-23 RX ORDER — GLYCOPYRROLATE 0.2 MG/ML
INJECTION, SOLUTION INTRAMUSCULAR; INTRAVENOUS AS NEEDED
Status: DISCONTINUED | OUTPATIENT
Start: 2023-05-23 | End: 2023-05-23 | Stop reason: SURG

## 2023-05-23 RX ORDER — PANTOPRAZOLE SODIUM 40 MG/1
40 TABLET, DELAYED RELEASE ORAL 2 TIMES DAILY
Qty: 180 TABLET | Refills: 3 | Status: SHIPPED | OUTPATIENT
Start: 2023-05-23 | End: 2023-08-21

## 2023-05-23 RX ORDER — SODIUM CHLORIDE, SODIUM LACTATE, POTASSIUM CHLORIDE, CALCIUM CHLORIDE 600; 310; 30; 20 MG/100ML; MG/100ML; MG/100ML; MG/100ML
INJECTION, SOLUTION INTRAVENOUS CONTINUOUS
Status: CANCELLED | OUTPATIENT
Start: 2023-05-23

## 2023-05-23 RX ORDER — NALOXONE HYDROCHLORIDE 0.4 MG/ML
80 INJECTION, SOLUTION INTRAMUSCULAR; INTRAVENOUS; SUBCUTANEOUS AS NEEDED
Status: CANCELLED | OUTPATIENT
Start: 2023-05-23 | End: 2023-05-23

## 2023-05-23 RX ORDER — ONDANSETRON 2 MG/ML
INJECTION INTRAMUSCULAR; INTRAVENOUS AS NEEDED
Status: DISCONTINUED | OUTPATIENT
Start: 2023-05-23 | End: 2023-05-23 | Stop reason: SURG

## 2023-05-23 RX ORDER — LIDOCAINE HYDROCHLORIDE 10 MG/ML
INJECTION, SOLUTION EPIDURAL; INFILTRATION; INTRACAUDAL; PERINEURAL AS NEEDED
Status: DISCONTINUED | OUTPATIENT
Start: 2023-05-23 | End: 2023-05-23 | Stop reason: SURG

## 2023-05-23 RX ORDER — SODIUM CHLORIDE, SODIUM LACTATE, POTASSIUM CHLORIDE, CALCIUM CHLORIDE 600; 310; 30; 20 MG/100ML; MG/100ML; MG/100ML; MG/100ML
INJECTION, SOLUTION INTRAVENOUS CONTINUOUS
Status: DISCONTINUED | OUTPATIENT
Start: 2023-05-23 | End: 2023-05-23

## 2023-05-23 RX ADMIN — LIDOCAINE HYDROCHLORIDE 80 MG: 10 INJECTION, SOLUTION EPIDURAL; INFILTRATION; INTRACAUDAL; PERINEURAL at 08:44:00

## 2023-05-23 RX ADMIN — SODIUM CHLORIDE, SODIUM LACTATE, POTASSIUM CHLORIDE, CALCIUM CHLORIDE: 600; 310; 30; 20 INJECTION, SOLUTION INTRAVENOUS at 08:42:00

## 2023-05-23 RX ADMIN — ONDANSETRON 4 MG: 2 INJECTION INTRAMUSCULAR; INTRAVENOUS at 08:44:00

## 2023-05-23 RX ADMIN — GLYCOPYRROLATE 0.2 MG: 0.2 INJECTION, SOLUTION INTRAMUSCULAR; INTRAVENOUS at 08:44:00

## 2023-05-23 RX ADMIN — SODIUM CHLORIDE, SODIUM LACTATE, POTASSIUM CHLORIDE, CALCIUM CHLORIDE: 600; 310; 30; 20 INJECTION, SOLUTION INTRAVENOUS at 09:45:00

## 2023-05-23 NOTE — DISCHARGE INSTRUCTIONS
.  .  .  Notes from Dr. Alice Montgomery:    I saw a \"hiatal hernia\" and severe reflux (\"GERD\") inflammation in your lower esophagus. There was acid damage in your lower esophagus and a small ulcer in your lower esophagus. I took some biopsies to further evaluate. Healthy exam of the stomach today. Great news. The colonoscopy examination today went very well. There were no colon polyps or colon cancers. I found a single small \"AVM\" superficial blood vessel in the lining of your colon. These can chronically bleed and cause the anemia you have been suffering. I burned this today (\"cauterized\") during the colonoscopy to make it scar over. That should take care of any bleeding from this area. Some of your bleeding was probably from your esophagus and the heartburn acid damage. I would recommend starting the pantoprazole antiacid medication. Please start taking this medication twice daily for the next 6 weeks and then once daily indefinitely after that. That should heal your esophagus and prevent further damage/ulcerations/bleeding from your lower esophagus. I have sent in a prescription for that pantoprazole medication to your [facility] [address]. Occasionally Medicare will not pay for the twice daily dosing. Let us know if there is a problem. .  .  Home Care Instructions for Colonoscopy and/or Gastroscopy with Sedation    Diet:  - Resume your regular diet as tolerated unless otherwise instructed. - Start with light meals to minimize bloating.  - Do not drink alcohol today. Medication:  - If you have questions about resuming your normal medications, please contact your Primary Care Physician. Activities:  - Take it easy today. Do not return to work today. - Do not drive today. - Do not operate any machinery today (including kitchen equipment).     Colonoscopy:  - You may notice some rectal \"spotting\" (a little blood on the toilet tissue) for a day or two after the exam. This is normal.  - If you experience any rectal bleeding (not spotting), persistent tenderness or sharp severe abdominal pains, oral temperature over 100 degrees Fahrenheit, light-headedness or dizziness, or any other problems, contact your doctor. Gastroscopy:  - You may have a sore throat for 2-3 days following the exam. This is normal. Gargling with warm salt water (1/2 tsp salt to 1 glass warm water) or using throat lozenges will help. - If you experience any sharp pain in your neck, abdomen or chest, vomiting of blood, oral temperature over 100 degrees Fahrenheit, light-headedness or dizziness, or any other problems, contact your doctor. **If unable to reach your doctor, please go to the [facility]**    - Your referring physician will receive a full report of your examination.  - If you do not hear from your doctor's office within two weeks of your biopsy, please call them for your results. You may be able to see your laboratory results in [facility] between 4 and 7 business days. In some cases, your physician may not have viewed the results before they are released to [facility] If you have questions regarding your results contact the physician who ordered the test/exam by phone or via [facility] by choosing \"Ask a Medical Question. \"

## 2023-05-23 NOTE — ANESTHESIA POSTPROCEDURE EVALUATION
Patient: Balwinder Epperson    Procedure Summary     Date: 05/23/23 Room / Location: [address] [place] [address] ENDOSCOPY 05 / [facility] ENDOSCOPY    Anesthesia Start: 3945 Anesthesia Stop: 6112    Procedures:       COLONOSCOPY      ESOPHAGOGASTRODUODENOSCOPY (EGD) Diagnosis:       Iron deficiency anemia, unspecified iron deficiency anemia type      (hiatal hernia, reflux esophagitis, esophageal ulcer; Colonic AVM's, hemorrhoids )    Surgeons: Juanita Rahman MD Anesthesiologist: Joel Willis CRNA    Anesthesia Type: MAC ASA Status: 3          Anesthesia Type: MAC    Vitals Value Taken Time   /76 05/23/23 0947   Temp  05/23/23 0948   Pulse 85 05/23/23 0947   Resp 17 05/23/23 0947   SpO2 98 % 05/23/23 0947       [facility] AN Post Evaluation:   Patient Evaluated in PACU  Patient Participation: complete - patient participated  Level of Consciousness: awake and alert  Pain Score: 0  Pain Management: adequate  Airway Patency:patent  Yes    Cardiovascular Status: blood pressure returned to baseline  Respiratory Status: acceptable and room air  Postoperative Hydration acceptable      Ольга Pickering CRNA  5/23/2023 9:48 AM

## 2023-05-24 ENCOUNTER — LAB ENCOUNTER (OUTPATIENT)
Dept: LAB | Facility: HOSPITAL | Age: 73
End: 2023-05-24
Attending: INTERNAL MEDICINE
Payer: MEDICARE

## 2023-05-24 ENCOUNTER — TELEPHONE (OUTPATIENT)
Dept: HEMATOLOGY/ONCOLOGY | Facility: HOSPITAL | Age: 73
End: 2023-05-24

## 2023-05-24 ENCOUNTER — PATIENT MESSAGE (OUTPATIENT)
Dept: HEMATOLOGY/ONCOLOGY | Facility: HOSPITAL | Age: 73
End: 2023-05-24

## 2023-05-24 DIAGNOSIS — D50.9 IRON DEFICIENCY ANEMIA: ICD-10-CM

## 2023-05-24 DIAGNOSIS — K90.9 IRON MALABSORPTION: ICD-10-CM

## 2023-05-24 DIAGNOSIS — D50.9 IRON DEFICIENCY ANEMIA: Primary | ICD-10-CM

## 2023-05-24 DIAGNOSIS — D50.9 IRON DEFICIENCY ANEMIA, UNSPECIFIED IRON DEFICIENCY ANEMIA TYPE: ICD-10-CM

## 2023-05-24 LAB
ANTIBODY SCREEN: NEGATIVE
BASOPHILS # BLD AUTO: 0.02 X10(3) UL (ref 0–0.2)
BASOPHILS NFR BLD AUTO: 0.3 %
DEPRECATED RDW RBC AUTO: 49.1 FL (ref 35.1–46.3)
EOSINOPHIL # BLD AUTO: 0.09 X10(3) UL (ref 0–0.7)
EOSINOPHIL NFR BLD AUTO: 1.5 %
ERYTHROCYTE [DISTWIDTH] IN BLOOD BY AUTOMATED COUNT: 18.3 % (ref 11–15)
HCT VFR BLD AUTO: 25.8 %
HGB BLD-MCNC: 7.2 G/DL
IMM GRANULOCYTES # BLD AUTO: 0.02 X10(3) UL (ref 0–1)
IMM GRANULOCYTES NFR BLD: 0.3 %
LYMPHOCYTES # BLD AUTO: 1.63 X10(3) UL (ref 1–4)
LYMPHOCYTES NFR BLD AUTO: 26.9 %
MCH RBC QN AUTO: 21.2 PG (ref 26–34)
MCHC RBC AUTO-ENTMCNC: 27.9 G/DL (ref 31–37)
MCV RBC AUTO: 76.1 FL
MONOCYTES # BLD AUTO: 0.61 X10(3) UL (ref 0.1–1)
MONOCYTES NFR BLD AUTO: 10.1 %
NEUTROPHILS # BLD AUTO: 3.68 X10 (3) UL (ref 1.5–7.7)
NEUTROPHILS # BLD AUTO: 3.68 X10(3) UL (ref 1.5–7.7)
NEUTROPHILS NFR BLD AUTO: 60.9 %
PLATELET # BLD AUTO: 451 10(3)UL (ref 150–450)
RBC # BLD AUTO: 3.39 X10(6)UL
RH BLOOD TYPE: POSITIVE
RH BLOOD TYPE: POSITIVE
WBC # BLD AUTO: 6.1 X10(3) UL (ref 4–11)

## 2023-05-24 PROCEDURE — 86901 BLOOD TYPING SEROLOGIC RH(D): CPT

## 2023-05-24 PROCEDURE — 85025 COMPLETE CBC W/AUTO DIFF WBC: CPT

## 2023-05-24 PROCEDURE — 36415 COLL VENOUS BLD VENIPUNCTURE: CPT

## 2023-05-24 PROCEDURE — 86900 BLOOD TYPING SEROLOGIC ABO: CPT

## 2023-05-24 PROCEDURE — 86920 COMPATIBILITY TEST SPIN: CPT

## 2023-05-24 PROCEDURE — 86850 RBC ANTIBODY SCREEN: CPT

## 2023-05-24 NOTE — TELEPHONE ENCOUNTER
Patient called. 5/12/23 HGB 7.1. Per patient has been feeling more SOB and not feeling well. Patient requesting a blood transfusion. Dr. Abdi Lai placed orders for [facility] 1 unit PRBC's for tomorrow. Spoke with Channing Stover in [facility] and SPO bed available at [employer] and check in at [facility]. Patient will go to lab tonight for CBC and T+C. Patient verbalizes understanding of plan.

## 2023-05-24 NOTE — TELEPHONE ENCOUNTER
Called patient to schedule iron infusion.  answered wife had a colonoscopy couldn't come to phone. said will return call once feeling better.

## 2023-05-25 ENCOUNTER — HOSPITAL ENCOUNTER (OUTPATIENT)
Facility: HOSPITAL | Age: 73
Discharge: HOME | End: 2023-05-25
Attending: INTERNAL MEDICINE | Admitting: INTERNAL MEDICINE
Payer: MEDICARE

## 2023-05-25 ENCOUNTER — TELEPHONE (OUTPATIENT)
Dept: GASTROENTEROLOGY | Facility: CLINIC | Age: 73
End: 2023-05-25

## 2023-05-25 VITALS
OXYGEN SATURATION: 100 % | HEART RATE: 69 BPM | TEMPERATURE: 98 F | RESPIRATION RATE: 18 BRPM | SYSTOLIC BLOOD PRESSURE: 138 MMHG | DIASTOLIC BLOOD PRESSURE: 83 MMHG

## 2023-05-25 DIAGNOSIS — D50.0 IRON DEFICIENCY ANEMIA DUE TO CHRONIC BLOOD LOSS: Primary | ICD-10-CM

## 2023-05-25 LAB
HCT VFR BLD AUTO: 27.6 %
HGB BLD-MCNC: 8.1 G/DL
RH BLOOD TYPE: POSITIVE

## 2023-05-25 PROCEDURE — 85014 HEMATOCRIT: CPT | Performed by: INTERNAL MEDICINE

## 2023-05-25 PROCEDURE — 30233N1 TRANSFUSION OF NONAUTOLOGOUS RED BLOOD CELLS INTO PERIPHERAL VEIN, PERCUTANEOUS APPROACH: ICD-10-PCS | Performed by: INTERNAL MEDICINE

## 2023-05-25 PROCEDURE — 36430 TRANSFUSION BLD/BLD COMPNT: CPT

## 2023-05-25 PROCEDURE — 85018 HEMOGLOBIN: CPT | Performed by: INTERNAL MEDICINE

## 2023-05-25 RX ORDER — SODIUM CHLORIDE, SODIUM LACTATE, POTASSIUM CHLORIDE, CALCIUM CHLORIDE 600; 310; 30; 20 MG/100ML; MG/100ML; MG/100ML; MG/100ML
INJECTION, SOLUTION INTRAVENOUS CONTINUOUS
Status: DISCONTINUED | OUTPATIENT
Start: 2023-05-25 | End: 2023-05-25

## 2023-05-25 RX ORDER — SODIUM CHLORIDE 9 MG/ML
INJECTION, SOLUTION INTRAVENOUS ONCE
Status: COMPLETED | OUTPATIENT
Start: 2023-05-25 | End: 2023-05-25

## 2023-05-25 RX ORDER — NALOXONE HYDROCHLORIDE 0.4 MG/ML
80 INJECTION, SOLUTION INTRAMUSCULAR; INTRAVENOUS; SUBCUTANEOUS AS NEEDED
Status: DISCONTINUED | OUTPATIENT
Start: 2023-05-25 | End: 2023-05-25

## 2023-05-25 NOTE — PLAN OF CARE
Direct admission. Pt alert and oriented x4. Vital signs stable. On room air. Ambulating independently. On general diet. Tolerating well. Pt admitted for blood transfusion. Pt was typed and screened. Received 1 unit of blood. Tolerated well. H&H done 45 minutes after infusion ended. Hgb at 8.2. Dr Feliciano Mcclain office contacted and pt was cleared for a discharge. IV removed with no signs and symptoms of an infection. Pt took all her belonging and was wheeled downstairs. Follow up with Dr. Mariah Collins scheduled for June 2nd.      Problem: DISCHARGE PLANNING  Goal: Discharge to home or other facility with appropriate resources  Description: INTERVENTIONS:  - Identify barriers to discharge w/pt and caregiver  - Include patient/family/discharge partner in discharge planning  - Arrange for needed discharge resources and transportation as appropriate  - Identify discharge learning needs (meds, wound care, etc)  - Arrange for interpreters to assist at discharge as needed  - Consider post-discharge preferences of patient/family/discharge partner  - Complete POLST form as appropriate  - Assess patient's ability to be responsible for managing their own health  - Refer to Case Management Department for coordinating discharge planning if the patient needs post-hospital services based on physician/LIP order or complex needs related to functional status, cognitive ability or social support system  Outcome: Progressing

## 2023-05-25 NOTE — TELEPHONE ENCOUNTER
GI RNs,    Please call Ms. Corrinne Gable to advise that the biopsies from Tuesday came back benign. She has severe GERD reflux esophagitis and an esophageal ulcer but nothing worse than that. Part or all of the anemia is from bleeding from the severe reflux esophagitis and ulcer. 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 \"  That does not cause anemia but we will need to keep an eye on it. The colonoscopy exam on Tuesday went very well. No colon polyp or colon cancer. I did find and burned/cauterized a small \"AVM lesion\" in the colon. Hopefully the anemia will get better after doing this and on the pantoprazole antiacid medication. Please advise that she continue on the recently prescribed pantoprazole medication (indefinitely). Please recommend and recall repeat EGD examination in 1 year.     - bayron

## 2023-05-25 NOTE — TELEPHONE ENCOUNTER
Wilfredo Lesch,     Great news on Tuesday's exams. Hopefully this severe iron deficiency anemia is due to esophageal bleeding and the colonic AVM. Let's see how she does from here.     - cb

## 2023-05-26 LAB
BLOOD TYPE BARCODE: 6200
UNIT VOLUME: 350 ML

## 2023-05-26 NOTE — TELEPHONE ENCOUNTER
Dr. Henry Wells    Patient wanted me to let you know that she got 1 unit of blood yesterday. Thank you    Patient contacted, reviewed below complete message. She voiced understanding and all questions answered. Snapshot updated. 1 year EGD recall entered into patient outreach in [facility].

## 2023-06-02 ENCOUNTER — OFFICE VISIT (OUTPATIENT)
Dept: HEMATOLOGY/ONCOLOGY | Facility: HOSPITAL | Age: 73
End: 2023-06-02
Attending: INTERNAL MEDICINE
Payer: MEDICARE

## 2023-06-02 VITALS
OXYGEN SATURATION: 100 % | TEMPERATURE: 98 F | HEART RATE: 68 BPM | DIASTOLIC BLOOD PRESSURE: 71 MMHG | RESPIRATION RATE: 18 BRPM | SYSTOLIC BLOOD PRESSURE: 135 MMHG

## 2023-06-02 DIAGNOSIS — D50.9 IRON DEFICIENCY ANEMIA, UNSPECIFIED IRON DEFICIENCY ANEMIA TYPE: Primary | ICD-10-CM

## 2023-06-02 PROCEDURE — 96374 THER/PROPH/DIAG INJ IV PUSH: CPT

## 2023-06-02 NOTE — PROGRESS NOTES
Patient arrives for venofer 1 of 5. Reports she is well. Reports she can tell her iron is low, complains of fatigue. Educated patient on what to expect, questions answered. Patient wondering if she needed labs drawn, spoke with Leandro Rand RN on behalf of Dr. Tiff Loco- no labs needed right now. Venofer given slowly over 5 minutes with free flowing NS. Positive blood return noted throughout. Observed 30 minutes, no s/s of reaction noted. Discharged ambulating independently with future appointments scheduled.

## 2023-06-06 ENCOUNTER — OFFICE VISIT (OUTPATIENT)
Dept: HEMATOLOGY/ONCOLOGY | Facility: HOSPITAL | Age: 73
End: 2023-06-06
Attending: INTERNAL MEDICINE
Payer: MEDICARE

## 2023-06-06 ENCOUNTER — OFFICE VISIT (OUTPATIENT)
Dept: INTERNAL MEDICINE CLINIC | Facility: CLINIC | Age: 73
End: 2023-06-06

## 2023-06-06 ENCOUNTER — TELEPHONE (OUTPATIENT)
Dept: HEMATOLOGY/ONCOLOGY | Facility: HOSPITAL | Age: 73
End: 2023-06-06

## 2023-06-06 VITALS
RESPIRATION RATE: 18 BRPM | OXYGEN SATURATION: 97 % | DIASTOLIC BLOOD PRESSURE: 76 MMHG | BODY MASS INDEX: 23.37 KG/M2 | HEART RATE: 72 BPM | SYSTOLIC BLOOD PRESSURE: 136 MMHG | WEIGHT: 154.19 LBS | HEIGHT: 68 IN

## 2023-06-06 VITALS
TEMPERATURE: 98 F | SYSTOLIC BLOOD PRESSURE: 132 MMHG | DIASTOLIC BLOOD PRESSURE: 62 MMHG | OXYGEN SATURATION: 97 % | RESPIRATION RATE: 18 BRPM | HEART RATE: 68 BPM

## 2023-06-06 DIAGNOSIS — Z78.0 ASYMPTOMATIC MENOPAUSE: ICD-10-CM

## 2023-06-06 DIAGNOSIS — D50.9 IRON DEFICIENCY ANEMIA, UNSPECIFIED IRON DEFICIENCY ANEMIA TYPE: Primary | ICD-10-CM

## 2023-06-06 DIAGNOSIS — D69.2 SENILE PURPURA (HCC): ICD-10-CM

## 2023-06-06 DIAGNOSIS — Z13.6 ENCOUNTER FOR SCREENING FOR CARDIOVASCULAR DISORDERS: ICD-10-CM

## 2023-06-06 DIAGNOSIS — D50.9 IRON DEFICIENCY ANEMIA, UNSPECIFIED IRON DEFICIENCY ANEMIA TYPE: ICD-10-CM

## 2023-06-06 DIAGNOSIS — K90.9 IRON MALABSORPTION: ICD-10-CM

## 2023-06-06 DIAGNOSIS — Z00.00 ENCOUNTER FOR ANNUAL HEALTH EXAMINATION: Primary | ICD-10-CM

## 2023-06-06 DIAGNOSIS — E28.39 FEMALE HYPOGONADISM DUE TO AROMATASE INHIBITOR THERAPY: ICD-10-CM

## 2023-06-06 DIAGNOSIS — Z11.59 NEED FOR HEPATITIS C SCREENING TEST: ICD-10-CM

## 2023-06-06 DIAGNOSIS — Z51.81 ENCOUNTER FOR MONITORING ADJUVANT HORMONAL THERAPY: ICD-10-CM

## 2023-06-06 DIAGNOSIS — C50.411 MALIGNANT NEOPLASM OF UPPER-OUTER QUADRANT OF RIGHT BREAST IN FEMALE, ESTROGEN RECEPTOR POSITIVE (HCC): ICD-10-CM

## 2023-06-06 DIAGNOSIS — Z17.0 MALIGNANT NEOPLASM OF UPPER-OUTER QUADRANT OF RIGHT BREAST IN FEMALE, ESTROGEN RECEPTOR POSITIVE (HCC): ICD-10-CM

## 2023-06-06 DIAGNOSIS — T45.1X5A FEMALE HYPOGONADISM DUE TO AROMATASE INHIBITOR THERAPY: ICD-10-CM

## 2023-06-06 DIAGNOSIS — Z79.899 ENCOUNTER FOR MONITORING ADJUVANT HORMONAL THERAPY: ICD-10-CM

## 2023-06-06 DIAGNOSIS — D50.9 IRON DEFICIENCY ANEMIA: ICD-10-CM

## 2023-06-06 DIAGNOSIS — M85.852 OSTEOPENIA OF NECK OF LEFT FEMUR: ICD-10-CM

## 2023-06-06 PROBLEM — Z12.39 SCREENING FOR BREAST CANCER: Status: RESOLVED | Noted: 2018-08-23 | Resolved: 2023-06-06

## 2023-06-06 PROBLEM — Z85.3 PERSONAL HISTORY OF BREAST CANCER: Status: RESOLVED | Noted: 2018-08-23 | Resolved: 2023-06-06

## 2023-06-06 PROBLEM — R45.82 WORRIES: Status: RESOLVED | Noted: 2023-05-02 | Resolved: 2023-06-06

## 2023-06-06 LAB
ANTIBODY SCREEN: NEGATIVE
HCT VFR BLD AUTO: 33.1 %
HCV AB SERPL QL IA: NONREACTIVE
HGB BLD-MCNC: 9.6 G/DL
RH BLOOD TYPE: POSITIVE

## 2023-06-06 PROCEDURE — 86900 BLOOD TYPING SEROLOGIC ABO: CPT

## 2023-06-06 PROCEDURE — 86803 HEPATITIS C AB TEST: CPT

## 2023-06-06 PROCEDURE — G0439 PPPS, SUBSEQ VISIT: HCPCS | Performed by: INTERNAL MEDICINE

## 2023-06-06 PROCEDURE — 86901 BLOOD TYPING SEROLOGIC RH(D): CPT

## 2023-06-06 PROCEDURE — 86850 RBC ANTIBODY SCREEN: CPT

## 2023-06-06 PROCEDURE — 85018 HEMOGLOBIN: CPT

## 2023-06-06 PROCEDURE — 1125F AMNT PAIN NOTED PAIN PRSNT: CPT | Performed by: INTERNAL MEDICINE

## 2023-06-06 PROCEDURE — 96374 THER/PROPH/DIAG INJ IV PUSH: CPT

## 2023-06-06 PROCEDURE — 85014 HEMATOCRIT: CPT

## 2023-06-06 PROCEDURE — 1111F DSCHRG MED/CURRENT MED MERGE: CPT | Performed by: INTERNAL MEDICINE

## 2023-06-06 NOTE — TELEPHONE ENCOUNTER
Patient called and notified that HGB 9.6. Per patient \"feeling a little better.  \" Discussed finishing venofer infusions and follow scheduled  with labs/Dr. Zan Snellen in August.

## 2023-06-06 NOTE — PROGRESS NOTES
Patient arrives for venofer 2 of 5. Pt states she is not feeling much better since after the blood transfusion, she is fatigued and achy. Pt requesting H&H to be drawn. Orders to draw H&H and T&S per Dr. Terrell Choi given slowly  with free flowing NS. Positive blood return noted throughout. Observed 30 minutes, no s/s of reaction noted. Discharged ambulating independently with future appointments scheduled. Instructed her she will be called if her H&H is low and if she is in need of a blood transfusion.

## 2023-06-09 ENCOUNTER — OFFICE VISIT (OUTPATIENT)
Dept: HEMATOLOGY/ONCOLOGY | Facility: HOSPITAL | Age: 73
End: 2023-06-09
Attending: INTERNAL MEDICINE
Payer: MEDICARE

## 2023-06-09 VITALS
DIASTOLIC BLOOD PRESSURE: 66 MMHG | TEMPERATURE: 98 F | HEART RATE: 66 BPM | SYSTOLIC BLOOD PRESSURE: 143 MMHG | RESPIRATION RATE: 16 BRPM | OXYGEN SATURATION: 98 %

## 2023-06-09 DIAGNOSIS — D50.9 IRON DEFICIENCY ANEMIA, UNSPECIFIED IRON DEFICIENCY ANEMIA TYPE: Primary | ICD-10-CM

## 2023-06-09 PROCEDURE — 96374 THER/PROPH/DIAG INJ IV PUSH: CPT

## 2023-06-09 NOTE — PROGRESS NOTES
Pt here for a venofer infusion. Pt denies any issues or concerns. Pt tolerated infusion without difficulty or complaint. 30 minute observation completed - vital signs stable (see flowsheet). Reviewed next apt date/time.       Education Record    Learner:  Patient    Disease / Diagnosis: EDITH    Barriers / Limitations:  None   Comments:    Method:  Discussion   Comments:    General Topics:  Plan of care reviewed   Comments:    Outcome:  Shows understanding   Comments:

## 2023-06-14 ENCOUNTER — OFFICE VISIT (OUTPATIENT)
Dept: HEMATOLOGY/ONCOLOGY | Facility: HOSPITAL | Age: 73
End: 2023-06-14
Attending: INTERNAL MEDICINE
Payer: MEDICARE

## 2023-06-14 VITALS
DIASTOLIC BLOOD PRESSURE: 60 MMHG | OXYGEN SATURATION: 99 % | HEART RATE: 64 BPM | SYSTOLIC BLOOD PRESSURE: 153 MMHG | TEMPERATURE: 98 F | RESPIRATION RATE: 16 BRPM

## 2023-06-14 DIAGNOSIS — D50.9 IRON DEFICIENCY ANEMIA, UNSPECIFIED IRON DEFICIENCY ANEMIA TYPE: Primary | ICD-10-CM

## 2023-06-14 PROCEDURE — 96374 THER/PROPH/DIAG INJ IV PUSH: CPT

## 2023-06-14 NOTE — PROGRESS NOTES
Patient arrives to infusion for 200mg Venofer, accompanied by spouse. Reviewed plan for day and reinforced iron infusion education. Venofer given IVP per order over 2 mins with free flowing NS, 30 mins observation period completed post-administration. Patient tolerated well with no s/s of adverse reaction, VS WNL. PIV removed, gauze and coban applied to site. Patient discharged stable aware of future appt.

## 2023-06-15 ENCOUNTER — TELEPHONE (OUTPATIENT)
Dept: HEMATOLOGY/ONCOLOGY | Facility: HOSPITAL | Age: 73
End: 2023-06-15

## 2023-06-15 NOTE — TELEPHONE ENCOUNTER
Patient called. Patient stated \" I feel tired and ache all over. I do not feel any better yet. I had to have a bitch fest to get my blood drawn. \" Discussed takes time for anemia to improve. 5/5 venofer scheduled tomorrow. Discussed improvement of HGB from 7.0-9.6 and joint pain to improve. Patient has follow up with Dr. Cassandra Greene 8/23/23 and will review labs at that time. Emotional support given. Informed if symptoms of aching continue should contact PCP. Patient verbalizes understanding.  Patient stated

## 2023-06-16 ENCOUNTER — OFFICE VISIT (OUTPATIENT)
Dept: HEMATOLOGY/ONCOLOGY | Facility: HOSPITAL | Age: 73
End: 2023-06-16
Attending: INTERNAL MEDICINE
Payer: MEDICARE

## 2023-06-16 VITALS
SYSTOLIC BLOOD PRESSURE: 148 MMHG | OXYGEN SATURATION: 99 % | HEART RATE: 62 BPM | TEMPERATURE: 98 F | DIASTOLIC BLOOD PRESSURE: 72 MMHG | RESPIRATION RATE: 16 BRPM

## 2023-06-16 DIAGNOSIS — D50.9 IRON DEFICIENCY ANEMIA, UNSPECIFIED IRON DEFICIENCY ANEMIA TYPE: Primary | ICD-10-CM

## 2023-06-16 PROCEDURE — 96374 THER/PROPH/DIAG INJ IV PUSH: CPT

## 2023-06-16 NOTE — PROGRESS NOTES
Pt arrived for Venofer 200mg IVP 5 of 5. PIV placed and Venofer given slow IVP via side port of a free flowing bag of 0.9NS. Observed for 30 minutes post infusion. Appeared to tolerate treatment, no s/s of rxn noted. Discharged home ambulating independently.   Lab Results   Component Value Date    HGB 9.6 (L) 06/06/2023    HCT 33.1 (L) 06/06/2023    MOO 4.1 (L) 05/12/2023    SAT 3 (L) 05/12/2023

## 2023-07-10 ENCOUNTER — HOSPITAL ENCOUNTER (OUTPATIENT)
Dept: MAMMOGRAPHY | Facility: HOSPITAL | Age: 73
Discharge: HOME | End: 2023-07-10
Attending: INTERNAL MEDICINE
Payer: MEDICARE

## 2023-07-10 DIAGNOSIS — Z12.31 ENCOUNTER FOR SCREENING MAMMOGRAM FOR MALIGNANT NEOPLASM OF BREAST: ICD-10-CM

## 2023-07-10 PROCEDURE — 77067 SCR MAMMO BI INCL CAD: CPT | Performed by: INTERNAL MEDICINE

## 2023-07-10 PROCEDURE — 77063 BREAST TOMOSYNTHESIS BI: CPT | Performed by: INTERNAL MEDICINE

## 2023-08-16 ENCOUNTER — PATIENT MESSAGE (OUTPATIENT)
Dept: HEMATOLOGY/ONCOLOGY | Facility: HOSPITAL | Age: 73
End: 2023-08-16

## 2023-08-16 NOTE — TELEPHONE ENCOUNTER
From: Talya Jewel  To: Sally Dodge MD  Sent: 8/16/2023 4:24 PM CDT  Subject: Joint pain hips    On 8/23 I am scheduled to have labs done before  my appointment. Is this just an iron and HGB labs? After reviewing my labs from 5/12/23 I would like  my Bun, Creatine, and Bun/Crea Ratio done because  my Ratio was high and since I still have joint pain  in my hips and was told joint pain would go away  after the iron was absorbed in my body within four  weeks from last iron infusion. It's past four weeks  and I still am not back to my old energy level. What  do you think and could this be approved? Thank you.

## 2023-08-18 NOTE — TELEPHONE ENCOUNTER
BUN and creatinine levels do not need to be rechecked at this time. An elevated BUN would not cause back pain. The elevation likely represented mild dehydration, as the osmolality was also elevated which also indicates dehydration. The creatinine and GFR were normal, indicating normal kidney function.

## 2023-08-23 ENCOUNTER — OFFICE VISIT (OUTPATIENT)
Dept: HEMATOLOGY/ONCOLOGY | Facility: HOSPITAL | Age: 73
End: 2023-08-23
Attending: INTERNAL MEDICINE
Payer: MEDICARE

## 2023-08-23 ENCOUNTER — HOSPITAL ENCOUNTER (OUTPATIENT)
Dept: GENERAL RADIOLOGY | Facility: HOSPITAL | Age: 73
Discharge: HOME | End: 2023-08-23
Attending: INTERNAL MEDICINE
Payer: MEDICARE

## 2023-08-23 VITALS
TEMPERATURE: 97 F | HEIGHT: 68 IN | HEART RATE: 65 BPM | SYSTOLIC BLOOD PRESSURE: 163 MMHG | DIASTOLIC BLOOD PRESSURE: 72 MMHG | WEIGHT: 156.38 LBS | BODY MASS INDEX: 23.7 KG/M2 | OXYGEN SATURATION: 100 % | RESPIRATION RATE: 16 BRPM

## 2023-08-23 DIAGNOSIS — M54.50 LUMBAR PAIN: ICD-10-CM

## 2023-08-23 DIAGNOSIS — D50.9 IRON DEFICIENCY ANEMIA, UNSPECIFIED IRON DEFICIENCY ANEMIA TYPE: ICD-10-CM

## 2023-08-23 DIAGNOSIS — M25.551 BILATERAL HIP PAIN: ICD-10-CM

## 2023-08-23 DIAGNOSIS — D50.9 IRON DEFICIENCY ANEMIA, UNSPECIFIED IRON DEFICIENCY ANEMIA TYPE: Primary | ICD-10-CM

## 2023-08-23 DIAGNOSIS — Z85.3 ENCOUNTER FOR FOLLOW-UP SURVEILLANCE OF BREAST CANCER: ICD-10-CM

## 2023-08-23 DIAGNOSIS — Z08 ENCOUNTER FOR FOLLOW-UP SURVEILLANCE OF BREAST CANCER: ICD-10-CM

## 2023-08-23 DIAGNOSIS — Z12.31 SCREENING MAMMOGRAM, ENCOUNTER FOR: ICD-10-CM

## 2023-08-23 DIAGNOSIS — M25.552 BILATERAL HIP PAIN: ICD-10-CM

## 2023-08-23 DIAGNOSIS — Z85.3 HISTORY OF RIGHT BREAST CANCER: ICD-10-CM

## 2023-08-23 LAB
BASOPHILS # BLD AUTO: 0.04 X10(3) UL (ref 0–0.2)
BASOPHILS NFR BLD AUTO: 0.8 %
DEPRECATED HBV CORE AB SER IA-ACNC: 44.7 NG/ML
DEPRECATED RDW RBC AUTO: 57.1 FL (ref 35.1–46.3)
EOSINOPHIL # BLD AUTO: 0.2 X10(3) UL (ref 0–0.7)
EOSINOPHIL NFR BLD AUTO: 3.9 %
ERYTHROCYTE [DISTWIDTH] IN BLOOD BY AUTOMATED COUNT: 17.3 % (ref 11–15)
HCT VFR BLD AUTO: 40.6 %
HGB BLD-MCNC: 13.8 G/DL
IMM GRANULOCYTES # BLD AUTO: 0.01 X10(3) UL (ref 0–1)
IMM GRANULOCYTES NFR BLD: 0.2 %
IRON SATN MFR SERPL: 23 %
IRON SERPL-MCNC: 79 UG/DL
LYMPHOCYTES # BLD AUTO: 1.9 X10(3) UL (ref 1–4)
LYMPHOCYTES NFR BLD AUTO: 36.8 %
MCH RBC QN AUTO: 31.1 PG (ref 26–34)
MCHC RBC AUTO-ENTMCNC: 34 G/DL (ref 31–37)
MCV RBC AUTO: 91.4 FL
MONOCYTES # BLD AUTO: 0.61 X10(3) UL (ref 0.1–1)
MONOCYTES NFR BLD AUTO: 11.8 %
NEUTROPHILS # BLD AUTO: 2.41 X10 (3) UL (ref 1.5–7.7)
NEUTROPHILS # BLD AUTO: 2.41 X10(3) UL (ref 1.5–7.7)
NEUTROPHILS NFR BLD AUTO: 46.5 %
PLATELET # BLD AUTO: 253 10(3)UL (ref 150–450)
RBC # BLD AUTO: 4.44 X10(6)UL
TIBC SERPL-MCNC: 341 UG/DL (ref 240–450)
TRANSFERRIN SERPL-MCNC: 229 MG/DL (ref 200–360)
WBC # BLD AUTO: 5.2 X10(3) UL (ref 4–11)

## 2023-08-23 PROCEDURE — 36415 COLL VENOUS BLD VENIPUNCTURE: CPT

## 2023-08-23 PROCEDURE — 85025 COMPLETE CBC W/AUTO DIFF WBC: CPT

## 2023-08-23 PROCEDURE — 80069 RENAL FUNCTION PANEL: CPT | Performed by: INTERNAL MEDICINE

## 2023-08-23 PROCEDURE — 73521 X-RAY EXAM HIPS BI 2 VIEWS: CPT | Performed by: INTERNAL MEDICINE

## 2023-08-23 PROCEDURE — 99211 OFF/OP EST MAY X REQ PHY/QHP: CPT

## 2023-08-23 PROCEDURE — 72110 X-RAY EXAM L-2 SPINE 4/>VWS: CPT | Performed by: INTERNAL MEDICINE

## 2023-08-23 PROCEDURE — 73522 X-RAY EXAM HIPS BI 3-4 VIEWS: CPT | Performed by: INTERNAL MEDICINE

## 2023-09-02 DIAGNOSIS — M54.50 LUMBAR PAIN: Primary | ICD-10-CM

## 2023-09-06 ENCOUNTER — OFFICE VISIT (OUTPATIENT)
Dept: INTERNAL MEDICINE CLINIC | Facility: CLINIC | Age: 73
End: 2023-09-06

## 2023-09-06 VITALS
SYSTOLIC BLOOD PRESSURE: 144 MMHG | BODY MASS INDEX: 24.65 KG/M2 | DIASTOLIC BLOOD PRESSURE: 74 MMHG | TEMPERATURE: 98 F | OXYGEN SATURATION: 100 % | HEIGHT: 68 IN | HEART RATE: 83 BPM | WEIGHT: 162.63 LBS

## 2023-09-06 DIAGNOSIS — R01.1 SYSTOLIC MURMUR: ICD-10-CM

## 2023-09-06 DIAGNOSIS — R03.0 WHITE COAT SYNDROME WITH HIGH BLOOD PRESSURE WITHOUT HYPERTENSION: ICD-10-CM

## 2023-09-06 DIAGNOSIS — D50.9 IRON DEFICIENCY ANEMIA, UNSPECIFIED IRON DEFICIENCY ANEMIA TYPE: Primary | ICD-10-CM

## 2023-09-06 PROCEDURE — 99214 OFFICE O/P EST MOD 30 MIN: CPT | Performed by: INTERNAL MEDICINE

## 2023-09-12 ENCOUNTER — APPOINTMENT (OUTPATIENT)
Dept: HEMATOLOGY/ONCOLOGY | Facility: HOSPITAL | Age: 73
End: 2023-09-12
Attending: INTERNAL MEDICINE
Payer: MEDICARE

## 2023-10-10 ENCOUNTER — HOSPITAL ENCOUNTER (OUTPATIENT)
Dept: CV DIAGNOSTICS | Facility: HOSPITAL | Age: 73
Discharge: HOME | End: 2023-10-10
Attending: INTERNAL MEDICINE
Payer: MEDICARE

## 2023-10-10 DIAGNOSIS — R01.1 SYSTOLIC MURMUR: ICD-10-CM

## 2023-10-10 PROCEDURE — 93306 TTE W/DOPPLER COMPLETE: CPT | Performed by: INTERNAL MEDICINE

## 2023-10-11 ENCOUNTER — PATIENT MESSAGE (OUTPATIENT)
Dept: INTERNAL MEDICINE CLINIC | Facility: CLINIC | Age: 73
End: 2023-10-11

## 2023-10-12 NOTE — TELEPHONE ENCOUNTER
Patient calling, confirmed name and . She has concerns about the result of her 2Decho. She sees \"The study was technically limited due to poor acoustic   window availability\" and asks what this means and whether it interferes with the accuracy of the result.

## 2023-10-12 NOTE — TELEPHONE ENCOUNTER
From: Fly Walter  To: Denise Anthony  Sent: 10/11/2023 6:44 PM CDT  Subject: Test results echo    Received test results 10/10/2023 from Echo. I   don't know what it all means to me. Please explain  what all the results are. Thursday 10/12 appointment in morning, back home by noon.      Luca Ulloa

## 2023-10-13 NOTE — TELEPHONE ENCOUNTER
Scan was good where the needed components can be seen   The trivial and mild regurgitation is not uncommon and we can monitor this for now   It should not cause any symptoms or anemia   The echo was done not only to make sure there wasn't any severe abnormalities which would need attention but also for baseline   This can be further discussed at the next visit

## 2023-10-13 NOTE — TELEPHONE ENCOUNTER
Dear Maddy Pagan,     Your ultrasound of the heart looked good overall. Pumping function is within normal limits. There is trivial to mild regurgitation of the valves- blood leaking back through the valve instead of pumping forward. At this small level there are usually no symptoms and no treatment required. Please call the office if you have any questions or concerns.      Best regards,  ADRIAN James  For Dr. Alessio Loza   Written by ADRIAN James on 10/11/2023 10:24 PM CDT  Seen by patient Kayla Phillips on 10/12/2023  3:02 PM      Dr. Melecio Ruiz to review [employer] message in response to above message and advise

## 2023-12-27 ENCOUNTER — MED REC SCAN ONLY (OUTPATIENT)
Dept: INTERNAL MEDICINE CLINIC | Facility: CLINIC | Age: 73
End: 2023-12-27

## 2024-02-02 ENCOUNTER — TELEPHONE (OUTPATIENT)
Facility: CLINIC | Age: 74
End: 2024-02-02

## 2024-02-02 NOTE — TELEPHONE ENCOUNTER
Patient outreach message received:    1 year EGD recall entered into patient outreach in [facility].     Recall reminder letter mailed out to patient.

## 2024-02-16 ENCOUNTER — TELEPHONE (OUTPATIENT)
Facility: CLINIC | Age: 74
End: 2024-02-16

## 2024-02-16 DIAGNOSIS — K22.719 BARRETT'S ESOPHAGUS WITH DYSPLASIA: ICD-10-CM

## 2024-02-16 DIAGNOSIS — K21.9 GASTROESOPHAGEAL REFLUX DISEASE, UNSPECIFIED WHETHER ESOPHAGITIS PRESENT: Primary | ICD-10-CM

## 2024-02-20 NOTE — TELEPHONE ENCOUNTER
Rohit Sepulveda MD   Physician  Gastroenterology     Operative Report     Signed     Date of Service: 5/23/2023  8:27 AM  Case Time: Procedures: Surgeons:   5/23/2023  8:45 AM COLONOSCOPY   ESOPHAGOGASTRODUODENOSCOPY (EGD)    Rohit Sepulveda MD               Signed         [facility]     EGD AND COLONOSCOPY PROCEDURE REPORTS:     DATE OF PROCEDURE:  5/23/2023     PCP: Nichelle Reyna MD     PREOPERATIVE DIAGNOSIS: Iron deficiency anemia     POSTOPERATIVE DIAGNOSIS:  See impression.     SURGEON:  Rohit Sepulveda M.D.     SEDATION:    MAC anesthesia provided by the Anesthesia Service.  MAC anesthesia requested due to anticipated intolerance of the EGD and colonoscopy examination under safe doses conscious sedation medications     Estimated blood loss: none/insignificant        EGD PROCEDURE:    After the nature and risks of EGD and colonoscopy examinations under MAC anesthesia were discussed with the patient and all questions answered, informed consent was obtained.  The patient was sedated as above.       Once sedated, the Olympus adult diagnostic gastroscope was placed in the patient's mouth and advanced under direct visualization through the oropharynx into the esophagus, on down through the stomach and pylorus to the duodenal bulb and second portions of the duodenum.  Retroflexion was performed in the stomach.     EGD FINDINGS:    Esophagus and GE junction: 3-4 cm hiatal hernia with severe acute and chronic reflux esophagitis changes just above at distal esophagus; 10 -15 mm above the top of the gastric folds.  There was scarring, chronic reflux esophagitis changes, one wide mucosal erosion and one deep oval shaped 6-7 mm esophageal ulceration, photographed.  There may have been an old healing vessel in the center.  Biopsies obtained of the erosion and margins of the somewhat deep distal esophagus ulcer.     Stomach: Clear secretions present.  Normal gastric mucosa  proximally and distally.     Duodenum: Clear secretions present. Normal duodenal bulb and second portion duodenum.     COLONOSCOPY PROCEDURE:    The patient was repositioned for colonoscopy examination.  Additional sedation given.  Digital rectal exam was performed, which showed no masses.  The Olympus pediatric video colonoscope was advanced under direct visualization through the entire length of the colon to the cecum.  Retroflex exam performed up the ascending colon to the hepatic flexure.  Cecum was confirmed by landmarks, including appendiceal orifice, cecal trifold, ileocecal valve.  Retroflexion was performed in the rectum.     The quality of the prep was excellent.  Peristalsis and spasm limited our exam of the sigmoid colon today.     COLONOSCOPY FINDINGS:  5-6 mm compound colonic AVM lesion identified in the cecum not far from the appendiceal orifice; ablated after submucosal injection of the \"ELEVIEW\" agent and then APC cautery ablation.  Some bleeding with first few doses of cautery.  No colonic polyp, neoplasm.  Medium sized internal hemorrhoids.     IMPRESSION:  3-4 cm hiatal hernia with severe acute and chronic reflux esophagitis changes just above at distal esophagus; one wide mucosal erosion and one deep oval shaped 6-7 mm esophageal ulceration, biopsied as above  5-6 mm compound colonic AVM lesion identified in the cecum not far from the appendiceal orifice; ablated after submucosal injection of the \"ELEVIEW\" agent and then APC cautery ablation.  No colonic polyp, neoplasm.  Medium sized internal hemorrhoids.     RECOMMENDATIONS:  Start high-dose PPI therapy; would continue indefinitely once daily  High-fiber diet.  Continue to monitor H&H, iron studies  Repeat colonoscopy examination in 10 years, or as per clinical condition at that time.  No aspirin or NSAID medications for the next 5 days to prevent bleeding            Electronically signed by Rohit Sepulveda MD at 5/23/2023  9:51  AM  ==================================  Final Diagnosis:      Distal esophagus; biopsies:   Fragments of squamocolumnar mucosa with focal superficial erosion, acute and chronic inflammation, features of reflux and complete intestinal metaplasia (see comment).  No evidence of dysplasia or carcinoma identified.     Comment:   Differential consideration for the finding of intestinal metaplasia associated with gastric type mucosa in this esophageal biopsy may include Figueroa’s esophagus vs intestinal metaplasia found in association with gastritis of the gastric cardia. Clinical and endoscopic correlation is recommended to further characterize this histologic finding.

## 2024-02-20 NOTE — TELEPHONE ENCOUNTER
Last Procedure, Date, MD:  Colon/EGD 05/23/2023, Dr Sepulveda  Last Diagnosis:  esophageal ulcer, reflux esophagitis, chronic AVM  Recalled (mth/yrs): 1 year  Sedation Used Previously:  MAC  Anticoagulants: no  Diuretics: no  Diabetic Med's (PO/Injectables): no  Weight loss Med's: no  Iron/Herbal/Multivitamin Supplements (RX/OTC): Vitamin D & MVI  Marijuana/Vaping/CBD: no  Height & Weight: 5'8\"/162  BMI:24.72  Hx of Cardiac/CVA Issues/(MI/Stroke): no  Devices Pacemaker/Defibrillator/Stents: no  Respiratory Issues/Oxygen Use/JORGE/COPD: no  Issues w/ Anesthesia: nauseated after anesthesia    Symptoms (Y/N):   Symptoms Details: belching    Special Comments/Notes: medications reviewed with the pt    Please advise on orders and prep.     Thank you!

## 2024-02-21 NOTE — TELEPHONE ENCOUNTER
Pt returning RN's call to schedule.  Pt states that she can be reached after 3 pm today.  Please call

## 2024-02-21 NOTE — TELEPHONE ENCOUNTER
Pt calling again.  She states she is at home and is available or she can be reached tomorrow after 9:30 am.  Please call

## 2024-02-22 NOTE — TELEPHONE ENCOUNTER
THANKS MARLINE!!!    GI schedulers, please call Ms. Figueroa to schedule EGD (stomach endoscopy) exam at [facility] ([facility])    BMI Readings from Last 1 Encounters:   09/06/23 24.72 kg/m²       MAC anesthesia    DX = GERD symptoms, Figueroa's esophagus    Medication instructions:    None

## 2024-02-23 NOTE — TELEPHONE ENCOUNTER
Scheduled for:  Egd 00182  Provider Name:    Date:  07/02/2024  Location:  [facility]  Sedation: Mac  Time:  1:30pm (pt is aware to arrive at 12:30pm    Prep:  Npo  Meds/Allergies Reconciled?:  Yes    Diagnosis with codes:   GERD symptoms,K21.9 Figueroa's esophagus K22.70  Was patient informed to call insurance with codes (Y/N):  Yes     Referral sent?:  Referral was sent at the time of electronic surgical scheduling.    EM or [facility] notified?:  I sent an electronic request to Endo Scheduling and received a confirmation today.       Medication Orders:  None  Misc Orders:  None     Further instructions given by staff:  I discussed the prep instructions with the patient which she verbally understood and is aware that I will send the instructions today.via [employer]

## 2024-02-26 ENCOUNTER — OFFICE VISIT (OUTPATIENT)
Dept: HEMATOLOGY/ONCOLOGY | Facility: HOSPITAL | Age: 74
End: 2024-02-26
Attending: INTERNAL MEDICINE
Payer: MEDICARE

## 2024-02-26 VITALS
HEART RATE: 78 BPM | HEIGHT: 68 IN | DIASTOLIC BLOOD PRESSURE: 79 MMHG | SYSTOLIC BLOOD PRESSURE: 167 MMHG | OXYGEN SATURATION: 100 % | BODY MASS INDEX: 24.8 KG/M2 | RESPIRATION RATE: 16 BRPM | TEMPERATURE: 98 F | WEIGHT: 163.63 LBS

## 2024-02-26 DIAGNOSIS — K90.9 IRON MALABSORPTION (HCC): ICD-10-CM

## 2024-02-26 DIAGNOSIS — D50.9 IRON DEFICIENCY ANEMIA, UNSPECIFIED IRON DEFICIENCY ANEMIA TYPE: ICD-10-CM

## 2024-02-26 DIAGNOSIS — D50.8 OTHER IRON DEFICIENCY ANEMIA: Primary | ICD-10-CM

## 2024-02-26 LAB
BASOPHILS # BLD AUTO: 0.05 X10(3) UL (ref 0–0.2)
BASOPHILS NFR BLD AUTO: 0.7 %
DEPRECATED HBV CORE AB SER IA-ACNC: 12.6 NG/ML
DEPRECATED RDW RBC AUTO: 46.7 FL (ref 35.1–46.3)
EOSINOPHIL # BLD AUTO: 0.15 X10(3) UL (ref 0–0.7)
EOSINOPHIL NFR BLD AUTO: 2.2 %
ERYTHROCYTE [DISTWIDTH] IN BLOOD BY AUTOMATED COUNT: 13.7 % (ref 11–15)
HCT VFR BLD AUTO: 34.1 %
HGB BLD-MCNC: 11.5 G/DL
IMM GRANULOCYTES # BLD AUTO: 0.02 X10(3) UL (ref 0–1)
IMM GRANULOCYTES NFR BLD: 0.3 %
IRON SATN MFR SERPL: 58 %
IRON SERPL-MCNC: 184 UG/DL
LYMPHOCYTES # BLD AUTO: 1.32 X10(3) UL (ref 1–4)
LYMPHOCYTES NFR BLD AUTO: 19.7 %
MCH RBC QN AUTO: 31.7 PG (ref 26–34)
MCHC RBC AUTO-ENTMCNC: 33.7 G/DL (ref 31–37)
MCV RBC AUTO: 93.9 FL
MONOCYTES # BLD AUTO: 0.69 X10(3) UL (ref 0.1–1)
MONOCYTES NFR BLD AUTO: 10.3 %
NEUTROPHILS # BLD AUTO: 4.46 X10 (3) UL (ref 1.5–7.7)
NEUTROPHILS # BLD AUTO: 4.46 X10(3) UL (ref 1.5–7.7)
NEUTROPHILS NFR BLD AUTO: 66.8 %
PLATELET # BLD AUTO: 295 10(3)UL (ref 150–450)
RBC # BLD AUTO: 3.63 X10(6)UL
TIBC SERPL-MCNC: 317 UG/DL (ref 250–425)
TRANSFERRIN SERPL-MCNC: 213 MG/DL (ref 250–380)
WBC # BLD AUTO: 6.7 X10(3) UL (ref 4–11)

## 2024-02-26 PROCEDURE — 83540 ASSAY OF IRON: CPT

## 2024-02-26 PROCEDURE — 84466 ASSAY OF TRANSFERRIN: CPT

## 2024-02-26 PROCEDURE — 36415 COLL VENOUS BLD VENIPUNCTURE: CPT

## 2024-02-26 PROCEDURE — 99215 OFFICE O/P EST HI 40 MIN: CPT | Performed by: INTERNAL MEDICINE

## 2024-02-26 PROCEDURE — 82728 ASSAY OF FERRITIN: CPT

## 2024-02-26 PROCEDURE — 85025 COMPLETE CBC W/AUTO DIFF WBC: CPT

## 2024-02-26 RX ORDER — MELATONIN
325
COMMUNITY

## 2024-02-26 RX ORDER — BIMATOPROST 0.1 MG/ML
SOLUTION/ DROPS OPHTHALMIC
COMMUNITY
Start: 2024-01-02

## 2024-02-26 RX ORDER — PANTOPRAZOLE SODIUM 40 MG/1
TABLET, DELAYED RELEASE ORAL
COMMUNITY
Start: 2023-05-23

## 2024-02-26 RX ORDER — BRINZOLAMIDE/BRIMONIDINE TARTRATE 10; 2 MG/ML; MG/ML
SUSPENSION/ DROPS OPHTHALMIC
COMMUNITY
Start: 2023-12-01

## 2024-02-26 RX ORDER — CHLORAL HYDRATE 500 MG
CAPSULE ORAL
COMMUNITY
Start: 2024-01-02

## 2024-02-26 NOTE — PROGRESS NOTES
HPI    Lashae Figueroa is a 73 year old female here for follow up of   Encounter Diagnoses   Name Primary?    Other iron deficiency anemia Yes    Iron malabsorption (HCC)    .    Patient completed last dose of Venofer on 6/16/2023.  This was 200 mg each dose times a total of 5 doses.  She did develop some joint aches.      States still having aches in the L hip but only once in a while.      She is taking fish oil again for her nails because \"nails were soft:\"    Did have GI w/u and had recall EGD for 1 yr.    ECOG PS 0.      Review of Systems:     Review of Systems   Constitutional:  Negative for fatigue and unexpected weight change.   Respiratory:  Negative for shortness of breath.    Cardiovascular:  Negative for chest pain and palpitations.   Gastrointestinal:  Negative for abdominal pain and blood in stool.   Genitourinary:  Negative for hematuria.    Musculoskeletal:  Positive for arthralgias (wrists) and back pain.        As above   Neurological:  Positive for headaches (states from sinus problems.). Negative for dizziness.   Hematological:  Bruises/bleeds easily (from lifting boxes.).   Psychiatric/Behavioral:  Positive for sleep disturbance.          Current Outpatient Medications   Medication Sig Dispense Refill    Omega-3 Fatty Acids (OMEGA-3 FISH OIL) 1000 MG Oral Cap       SIMBRINZA 1-0.2 % Ophthalmic Suspension       LUMIGAN 0.01 % Ophthalmic Solution       pantoprazole 40 MG Oral Tab EC       ferrous sulfate 325 (65 FE) MG Oral Tab EC Take 1 tablet (325 mg total) by mouth daily with breakfast.      Multiple Vitamins-Minerals (VISION-LISETTE PRESERVE OR) Take by mouth 2 (two) times a day.      Multiple Vitamins-Minerals (CENTRUM SILVER ADULT 50+ OR) Take by mouth daily.      Cholecalciferol (VITAMIN D) 125 MCG (5000 UT) Oral Cap Take by mouth daily.      Calcium Carbonate-Vitamin D (CALCIUM-VITAMIN D) 600-200 MG-UNIT Oral Cap Take by mouth daily. 1200 mg every day       Allergies:   Allergies    Allergen Reactions    Ambien [Zolpidem] INSOMNIA    Antihistamine [Clarithromycin] INSOMNIA    Dalmane [Flurazepam] OTHER (SEE COMMENTS)     Severe headaches    Decongestant [Oxymetazoline] JITTERY    Duricef [Cefadroxil] ITCHING     dermatitis    Penicillins HIVES and RASH    Codeine ANAPHYLAXIS    Demerol [Meperidine] OTHER (SEE COMMENTS)     Therapeutic failure    Ibuprofen OTHER (SEE COMMENTS)    Rash Away  [Sensi-Care Protective Barrier] ANAPHYLAXIS    Rofecoxib ANXIETY    Vioxx [Rofecoxib] OTHER (SEE COMMENTS)       Past Medical History:   Diagnosis Date    Allergic rhinitis child    Flowers and evergreens    Anal lesion 09/23/2009    Edil Velasquez; Condyloma, squamous    Arthritis     Rt wrist and hand    Breast cancer (HCC)     Breast cancer (HCC) 2003    lumpectomy, axillary sampling, Adriamycin & Cytoxan    Cancer (HCC) 5/2003    Right breast cancer surgery, chemo, radiation    Encounter for adjustment or management of vascular access device 2004    venous access; port removal    Encounter for fitting of portacath 2003    venous access; portacath placement    Esophageal reflux May, 2023    H/O arthroscopy 1992    RT wrist    Osteopenia of neck of femur 08/27/2021    PONV (postoperative nausea and vomiting)     Removal of pin, plate, miriam, or screw 1992    removal of screws    S/P T&A (status post tonsillectomy and adenoidectomy) 1954    Status post osteotomy 1992    ulnar osteotomy    Stress at home     mother ECF/sister/son's wedding    Vaccine for diphtheria-tetanus-pertussis, combined 2011    as per NG:  \"DTAP vaccine\"     Past Surgical History:   Procedure Laterality Date    ADENOIDECTOMY      BREAST LUMPECTOMY      CHEMOTHERAPY  2003    COLONOSCOPY  08/27/2009    Bhargav Wilson; condyloma acuminatum, rectal hyperplastic polyp    COLONOSCOPY  2004    COLONOSCOPY N/A 05/23/2023    Procedure: COLONOSCOPY;  Surgeon: Rohit Sepulveda MD;  Location: [facility] ENDOSCOPY    LARYNGOSCOPY,DIRCT,OP  SCOPE,FB REMV  2016    LUMPECTOMY RIGHT Right       30 yrs old  31 yrs old    80 daughter,, 81 son    OTHER SURGICAL HISTORY  1954    Removal of birthmark on right upper leg.     -ulna osteo    RADIATION RIGHT Right     SKIN SURGERY      TONSILLECTOMY       Social History     Socioeconomic History    Marital status:    Tobacco Use    Smoking status: Former     Packs/day: 0.50     Years: 25.00     Additional pack years: 0.00     Total pack years: 12.50     Types: Cigarettes     Quit date: 2007     Years since quittin.8    Smokeless tobacco: Former     Quit date: 2008   Vaping Use    Vaping Use: Never used   Substance and Sexual Activity    Alcohol use: Yes     Alcohol/week: 7.0 standard drinks of alcohol     Types: 7 Glasses of wine per week     Comment: Enjoy a glass of wine with dinner    Drug use: No   Other Topics Concern    Caffeine Concern Yes     Comment: 4 cups coffee daily    Left Handed No    Right Handed Yes    Currently spends a great deal of time in the sun No    History of tanning No    Hx of Spending Great Deal of Time in Sun No    Bad sunburns in the past No    Tanning Salons in the Past No    Hx of Radiation Treatments Yes         Family History   Problem Relation Age of Onset    Cancer Father         lung    Dementia Mother         7 yrs Nursing home    Cancer Sister         bn brain tumor    Colon Cancer Brother     Prostate Cancer Brother         lives in [place]    Cancer Brother         Liver    Cancer Brother         Prostate    Diabetes Brother     Hypertension Brother     Breast Cancer Self 53            Ovarian Cancer Neg          PHYSICAL EXAM:    BP (!) 167/79 (BP Location: Left arm, Patient Position: Sitting, Cuff Size: adult)   Pulse 78   Temp 97.7 °F (36.5 °C) (Oral)   Resp 16   Ht 1.727 m (5' 8\")   Wt 74.2 kg (163 lb 9.6 oz)   SpO2 100%   BMI 24.88 kg/m²   Wt Readings from Last 6 Encounters:   24 74.2 kg  (163 lb 9.6 oz)   09/06/23 73.8 kg (162 lb 9.6 oz)   08/23/23 70.9 kg (156 lb 6.4 oz)   06/06/23 69.9 kg (154 lb 3.2 oz)   05/19/23 71.2 kg (157 lb)   05/18/23 71.2 kg (157 lb)     Physical Exam  General: Patient is alert, not in acute distress.  HEENT: EOMs intact. PERRL.   Neck: No JVD. No palpable lymphadenopathy. Neck is supple.  Chest: Clear to auscultation.  Breasts:  Deferred.  Heart: Regular rate and rhythm.   Abdomen: Soft, non tender with good bowel sounds.  Extremities: No edema.  Neurological: Grossly intact.   Lymphatics: There is no palpable lymphadenopathy throughout in the cervical, supraclavicular, axillary, or inguinal regions.  Psych/Depression: nl        ASSESSMENT/PLAN:     1. Other iron deficiency anemia    2. Iron malabsorption (HCC)      Iron deficiency anemia:     Patient received 200 mg of Venofer x3 doses.  This resulted in correction of her iron deficiency anemia with normal hemoglobin and normal iron studies.  Patient has been on oral maintenance iron therapy, which she is taking daily.      However, she has exacerbation of the anemia, as again has anemia secondary to iron deficiency, this by oral maintenance.  Discussed with the patient that we will proceed with repeat course of Venofer 200 mg x 5 doses.  Ferritin is low despite iron studies being elevated.      She is to follow-up with the GI for recall EGD in 1 year.  This is already scheduled for 7/2/2024.    Recommended to discontinue fish oil due to risk of bleeding.  Patient still taking fish oil.    We will have 3 months follow-up with CBC and iron studies to ensure that patient still has response from iron infusions.    For history of breast cancer, she is scheduled for screening mammogram on 7/15/2024, she will have follow-up for her breast imaging and for history of breast cancer surveillance in approximately 6 months time.    No orders of the defined types were placed in this encounter.    MDM high risk medication  ordered    Results From Past 48 Hours:  Recent Results (from the past 48 hour(s))   Iron And Tibc    Collection Time: 02/26/24 10:13 AM   Result Value Ref Range    Iron 184 (H) 50 - 170 ug/dL    Transferrin 213 (L) 250 - 380 mg/dL    Total Iron Binding Capacity 317 250 - 425 ug/dL    % Saturation 58 (H) 15 - 50 %   Ferritin    Collection Time: 02/26/24 10:13 AM   Result Value Ref Range    Ferritin 12.6 (L) 18.0 - 340.0 ng/mL   CBC W/ DIFFERENTIAL    Collection Time: 02/26/24 10:13 AM   Result Value Ref Range    WBC 6.7 4.0 - 11.0 x10(3) uL    RBC 3.63 (L) 3.80 - 5.30 x10(6)uL    HGB 11.5 (L) 12.0 - 16.0 g/dL    HCT 34.1 (L) 35.0 - 48.0 %    MCV 93.9 80.0 - 100.0 fL    MCH 31.7 26.0 - 34.0 pg    MCHC 33.7 31.0 - 37.0 g/dL    RDW-SD 46.7 (H) 35.1 - 46.3 fL    RDW 13.7 11.0 - 15.0 %    .0 150.0 - 450.0 10(3)uL    Neutrophil Absolute Prelim 4.46 1.50 - 7.70 x10 (3) uL    Neutrophil Absolute 4.46 1.50 - 7.70 x10(3) uL    Lymphocyte Absolute 1.32 1.00 - 4.00 x10(3) uL    Monocyte Absolute 0.69 0.10 - 1.00 x10(3) uL    Eosinophil Absolute 0.15 0.00 - 0.70 x10(3) uL    Basophil Absolute 0.05 0.00 - 0.20 x10(3) uL    Immature Granulocyte Absolute 0.02 0.00 - 1.00 x10(3) uL    Neutrophil % 66.8 %    Lymphocyte % 19.7 %    Monocyte % 10.3 %    Eosinophil % 2.2 %    Basophil % 0.7 %    Immature Granulocyte % 0.3 %       Imaging & Referrals:  None   No orders of the defined types were placed in this encounter.    Component      Latest Ref Rng 8/23/2023 2/26/2024   WBC      4.0 - 11.0 x10(3) uL 5.2  6.7    RBC      3.80 - 5.30 x10(6)uL 4.44  3.63 (L)    Hemoglobin      12.0 - 16.0 g/dL 13.8  11.5 (L)    Hematocrit      35.0 - 48.0 % 40.6  34.1 (L)    MCV      80.0 - 100.0 fL 91.4  93.9    MCH      26.0 - 34.0 pg 31.1  31.7    MCHC      31.0 - 37.0 g/dL 34.0  33.7    RDW-SD      35.1 - 46.3 fL 57.1 (H)  46.7 (H)    RDW      11.0 - 15.0 % 17.3 (H)  13.7    Platelet Count      150.0 - 450.0 10(3)uL 253.0  295.0    Prelim  Neutrophil Abs      1.50 - 7.70 x10 (3) uL 2.41  4.46    Neutrophils Absolute      1.50 - 7.70 x10(3) uL 2.41  4.46    Lymphocytes Absolute      1.00 - 4.00 x10(3) uL 1.90  1.32    Monocytes Absolute      0.10 - 1.00 x10(3) uL 0.61  0.69    Eosinophils Absolute      0.00 - 0.70 x10(3) uL 0.20  0.15    Basophils Absolute      0.00 - 0.20 x10(3) uL 0.04  0.05    Immature Granulocyte Absolute      0.00 - 1.00 x10(3) uL 0.01  0.02    Neutrophils %      % 46.5  66.8    Lymphocytes %      % 36.8  19.7    Monocytes %      % 11.8  10.3    Eosinophils %      % 3.9  2.2    Basophils %      % 0.8  0.7    Immature Granulocyte %      % 0.2  0.3    Iron, Serum      50 - 170 ug/dL 79  184 (H)    Transferrin      250 - 380 mg/dL 229  213 (L)    Iron Bind.Cap.(TIBC)      250 - 425 ug/dL 341  317    Iron Saturation      15 - 50 % 23  58 (H)    FERRITIN      18.0 - 340.0 ng/mL 44.7  12.6 (L)       Legend:  (H) High  (L) Low

## 2024-03-06 ENCOUNTER — OFFICE VISIT (OUTPATIENT)
Dept: HEMATOLOGY/ONCOLOGY | Facility: HOSPITAL | Age: 74
End: 2024-03-06
Attending: INTERNAL MEDICINE
Payer: MEDICARE

## 2024-03-06 VITALS
SYSTOLIC BLOOD PRESSURE: 164 MMHG | HEART RATE: 79 BPM | TEMPERATURE: 98 F | RESPIRATION RATE: 16 BRPM | OXYGEN SATURATION: 99 % | DIASTOLIC BLOOD PRESSURE: 75 MMHG

## 2024-03-06 DIAGNOSIS — D50.9 IRON DEFICIENCY ANEMIA, UNSPECIFIED IRON DEFICIENCY ANEMIA TYPE: Primary | ICD-10-CM

## 2024-03-06 PROCEDURE — 96374 THER/PROPH/DIAG INJ IV PUSH: CPT

## 2024-03-06 NOTE — PROGRESS NOTES
Pt here for Venofer 200mg IVP. Patient arrived ambulatory to unit with spouse. PIV started to L AC, good blood return present. IV Venofer infused over 5 min. Patient tolerated. PIV removed. Pt denies any issues or concerns.      Ordering MD: Davis     Pt tolerated infusion without difficulty or complaint. Reviewed next apt date/time: 3/8      Education Record  Learner:  Patient and Spouse  Disease / Diagnosis: EDITH  Barriers / Limitations:  None  Method:  Discussion and Printed material  General Topics:  Medication, Side effects and symptom management, and Plan of care reviewed  Outcome:  Shows understanding

## 2024-03-08 ENCOUNTER — OFFICE VISIT (OUTPATIENT)
Dept: HEMATOLOGY/ONCOLOGY | Facility: HOSPITAL | Age: 74
End: 2024-03-08
Attending: INTERNAL MEDICINE
Payer: MEDICARE

## 2024-03-08 VITALS
RESPIRATION RATE: 16 BRPM | TEMPERATURE: 98 F | HEART RATE: 95 BPM | OXYGEN SATURATION: 99 % | DIASTOLIC BLOOD PRESSURE: 78 MMHG | SYSTOLIC BLOOD PRESSURE: 147 MMHG

## 2024-03-08 DIAGNOSIS — D50.9 IRON DEFICIENCY ANEMIA, UNSPECIFIED IRON DEFICIENCY ANEMIA TYPE: Primary | ICD-10-CM

## 2024-03-08 PROCEDURE — 96374 THER/PROPH/DIAG INJ IV PUSH: CPT

## 2024-03-08 NOTE — PROGRESS NOTES
Pt here for Venofer 200mg IVP. Patient arrived ambulatory to unit with spouse. PIV started to L AC, good blood return present. IV Venofer infused over 5 min. Patient tolerated. PIV removed. Pt denies any issues or concerns.      Ordering MD: Davis     Pt tolerated infusion without difficulty or complaint. Reviewed next apt date/time:     Education Record  Learner:  Patient and Spouse  Disease / Diagnosis: EDITH  Barriers / Limitations:  None  Method:  Discussion and Printed material  General Topics:  Medication, Side effects and symptom management, and Plan of care reviewed  Outcome:  Shows understanding

## 2024-03-12 ENCOUNTER — OFFICE VISIT (OUTPATIENT)
Dept: HEMATOLOGY/ONCOLOGY | Facility: HOSPITAL | Age: 74
End: 2024-03-12
Attending: INTERNAL MEDICINE
Payer: MEDICARE

## 2024-03-12 VITALS
SYSTOLIC BLOOD PRESSURE: 138 MMHG | HEART RATE: 64 BPM | DIASTOLIC BLOOD PRESSURE: 71 MMHG | OXYGEN SATURATION: 98 % | RESPIRATION RATE: 18 BRPM | WEIGHT: 164.81 LBS | TEMPERATURE: 97 F | BODY MASS INDEX: 24.98 KG/M2 | HEIGHT: 67.99 IN

## 2024-03-12 DIAGNOSIS — D50.9 IRON DEFICIENCY ANEMIA, UNSPECIFIED IRON DEFICIENCY ANEMIA TYPE: Primary | ICD-10-CM

## 2024-03-12 PROCEDURE — 96374 THER/PROPH/DIAG INJ IV PUSH: CPT

## 2024-03-12 NOTE — PROGRESS NOTES
Patient arrives for venofer 200 mg 3 of 3. Reports dizziness after previous venofer infusions.  Denies CP or SOB.   Reviewed plan of care including length of infusion, observation, and potential s/s of adverse reaction. PIV established with brisk blood return noted.  Venofer given slow IVP via side port of a free flowing bag of  0.9NS.  Because of previous dizziness, pt observed post infusion. Patient appeared to tolerated infusion, post vital signs WNL. PIV removed, site covered with 2x2 and coban. Discharged stable.

## 2024-05-29 ENCOUNTER — APPOINTMENT (OUTPATIENT)
Dept: HEMATOLOGY/ONCOLOGY | Facility: HOSPITAL | Age: 74
End: 2024-05-29
Attending: INTERNAL MEDICINE
Payer: MEDICARE

## 2024-06-07 DIAGNOSIS — K90.9 IRON MALABSORPTION (HCC): ICD-10-CM

## 2024-06-07 DIAGNOSIS — D50.9 IRON DEFICIENCY ANEMIA, UNSPECIFIED IRON DEFICIENCY ANEMIA TYPE: Primary | ICD-10-CM

## 2024-06-10 ENCOUNTER — OFFICE VISIT (OUTPATIENT)
Dept: HEMATOLOGY/ONCOLOGY | Facility: HOSPITAL | Age: 74
End: 2024-06-10
Attending: INTERNAL MEDICINE
Payer: MEDICARE

## 2024-06-10 VITALS
RESPIRATION RATE: 18 BRPM | BODY MASS INDEX: 25.16 KG/M2 | OXYGEN SATURATION: 98 % | SYSTOLIC BLOOD PRESSURE: 155 MMHG | DIASTOLIC BLOOD PRESSURE: 78 MMHG | HEIGHT: 68 IN | HEART RATE: 81 BPM | WEIGHT: 166 LBS | TEMPERATURE: 98 F

## 2024-06-10 DIAGNOSIS — K90.9 IRON MALABSORPTION (HCC): ICD-10-CM

## 2024-06-10 DIAGNOSIS — D50.9 IRON DEFICIENCY ANEMIA, UNSPECIFIED IRON DEFICIENCY ANEMIA TYPE: ICD-10-CM

## 2024-06-10 DIAGNOSIS — D50.9 IRON DEFICIENCY ANEMIA, UNSPECIFIED IRON DEFICIENCY ANEMIA TYPE: Primary | ICD-10-CM

## 2024-06-10 LAB
BASOPHILS # BLD AUTO: 0.03 X10(3) UL (ref 0–0.2)
BASOPHILS NFR BLD AUTO: 0.5 %
DEPRECATED HBV CORE AB SER IA-ACNC: 13.3 NG/ML
DEPRECATED RDW RBC AUTO: 52.8 FL (ref 35.1–46.3)
EOSINOPHIL # BLD AUTO: 0.1 X10(3) UL (ref 0–0.7)
EOSINOPHIL NFR BLD AUTO: 1.5 %
ERYTHROCYTE [DISTWIDTH] IN BLOOD BY AUTOMATED COUNT: 15.2 % (ref 11–15)
HCT VFR BLD AUTO: 34.2 %
HGB BLD-MCNC: 11.3 G/DL
IMM GRANULOCYTES # BLD AUTO: 0.02 X10(3) UL (ref 0–1)
IMM GRANULOCYTES NFR BLD: 0.3 %
IRON SATN MFR SERPL: 10 %
IRON SERPL-MCNC: 33 UG/DL
LYMPHOCYTES # BLD AUTO: 1.45 X10(3) UL (ref 1–4)
LYMPHOCYTES NFR BLD AUTO: 22.1 %
MCH RBC QN AUTO: 30.9 PG (ref 26–34)
MCHC RBC AUTO-ENTMCNC: 33 G/DL (ref 31–37)
MCV RBC AUTO: 93.4 FL
MONOCYTES # BLD AUTO: 0.65 X10(3) UL (ref 0.1–1)
MONOCYTES NFR BLD AUTO: 9.9 %
NEUTROPHILS # BLD AUTO: 4.3 X10 (3) UL (ref 1.5–7.7)
NEUTROPHILS # BLD AUTO: 4.3 X10(3) UL (ref 1.5–7.7)
NEUTROPHILS NFR BLD AUTO: 65.7 %
PLATELET # BLD AUTO: 309 10(3)UL (ref 150–450)
RBC # BLD AUTO: 3.66 X10(6)UL
TIBC SERPL-MCNC: 331 UG/DL (ref 250–425)
TRANSFERRIN SERPL-MCNC: 222 MG/DL (ref 250–380)
WBC # BLD AUTO: 6.6 X10(3) UL (ref 4–11)

## 2024-06-10 PROCEDURE — 84466 ASSAY OF TRANSFERRIN: CPT

## 2024-06-10 PROCEDURE — 83540 ASSAY OF IRON: CPT

## 2024-06-10 PROCEDURE — 36415 COLL VENOUS BLD VENIPUNCTURE: CPT

## 2024-06-10 PROCEDURE — 99215 OFFICE O/P EST HI 40 MIN: CPT | Performed by: INTERNAL MEDICINE

## 2024-06-10 PROCEDURE — 82728 ASSAY OF FERRITIN: CPT

## 2024-06-10 PROCEDURE — G2211 COMPLEX E/M VISIT ADD ON: HCPCS | Performed by: INTERNAL MEDICINE

## 2024-06-10 PROCEDURE — 85025 COMPLETE CBC W/AUTO DIFF WBC: CPT

## 2024-06-10 RX ORDER — LATANOPROST 50 UG/ML
1 SOLUTION/ DROPS OPHTHALMIC NIGHTLY
COMMUNITY
Start: 2023-12-29

## 2024-06-10 NOTE — PROGRESS NOTES
HPI    Lashae Figueroa is a 73 year old female here for follow up of   Encounter Diagnosis   Name Primary?    Iron deficiency anemia, unspecified iron deficiency anemia type Yes     Patient received 600 mg of Venofer in divided doses of 200 mg in March 2024.     States still having aches in the L hip but only once in a while.      Not taking po iron as directed.     Did have GI w/u and had recall EGD for 1 yr.    ECOG PS 0.      Review of Systems:     Review of Systems   Constitutional:  Negative for fatigue and unexpected weight change.   Respiratory:  Negative for shortness of breath.    Cardiovascular:  Negative for chest pain and palpitations.   Gastrointestinal:  Negative for abdominal pain and blood in stool.   Genitourinary:  Positive for nocturia. Negative for hematuria.    Musculoskeletal:  Positive for arthralgias (wrists) and back pain.        As above   Neurological:  Negative for dizziness and headaches.   Hematological:  Does not bruise/bleed easily.   Psychiatric/Behavioral:  Positive for sleep disturbance.          Current Outpatient Medications   Medication Sig Dispense Refill    Multiple Vitamins-Minerals (PRESERVISION AREDS 2 OR) Take by mouth 2 (two) times a day.      latanoprost 0.005 % Ophthalmic Solution Place 1 drop into both eyes nightly. TAKE AT BEDTIME      SIMBRINZA 1-0.2 % Ophthalmic Suspension Place 1 drop into both eyes in the morning and 1 drop before bedtime.      pantoprazole 40 MG Oral Tab EC Take 1 tablet (40 mg total) by mouth 2 (two) times daily before meals.      Multiple Vitamins-Minerals (CENTRUM SILVER ADULT 50+ OR) Take by mouth daily.       Allergies:   Allergies   Allergen Reactions    Ambien [Zolpidem] INSOMNIA    Antihistamine [Clarithromycin] INSOMNIA    Dalmane [Flurazepam] OTHER (SEE COMMENTS)     Severe headaches    Decongestant [Oxymetazoline] JITTERY    Duricef [Cefadroxil] ITCHING     dermatitis    Penicillins HIVES and RASH    Codeine ANAPHYLAXIS    Demerol  [Meperidine] OTHER (SEE COMMENTS)     Therapeutic failure    Ibuprofen OTHER (SEE COMMENTS)    Rash Away  [Sensi-Care Protective Barrier] ANAPHYLAXIS    Rofecoxib ANXIETY    Vioxx [Rofecoxib] OTHER (SEE COMMENTS)       Past Medical History:    Allergic rhinitis    Flowers and evergreens    Anal lesion    Siavelis, Edil; Condyloma, squamous    Arthritis    Rt wrist and hand    Breast cancer (HCC)    Breast cancer (HCC)    lumpectomy, axillary sampling, Adriamycin & Cytoxan    Cancer (HCC)    Right breast cancer surgery, chemo, radiation    Encounter for adjustment or management of vascular access device    venous access; port removal    Encounter for fitting of portacath    venous access; portacath placement    Esophageal reflux    H/O arthroscopy    RT wrist    Osteopenia of neck of femur    PONV (postoperative nausea and vomiting)    Removal of pin, plate, miriam, or screw    removal of screws    S/P T&A (status post tonsillectomy and adenoidectomy)    Status post osteotomy    ulnar osteotomy    Stress at home    mother ECF/sister/son's wedding    Vaccine for diphtheria-tetanus-pertussis, combined    as per NG:  \"DTAP vaccine\"     Past Surgical History:   Procedure Laterality Date    Adenoidectomy      Breast lumpectomy      Chemotherapy  2003    Colonoscopy  2009    Bhargav Wilson; condyloma acuminatum, rectal hyperplastic polyp    Colonoscopy  2004    Colonoscopy N/A 2023    Procedure: COLONOSCOPY;  Surgeon: Rohit Sepulveda MD;  [facility]: [facility]    Laryngoscopy,dirct,op scope,fb remv  2016    Lumpectomy right Right       30 yrs old  31 yrs old    80 daughter,, 81 son    Other surgical history  1954    Removal of birthmark on right upper leg.     -ulna osteo    Radiation right Right     Skin surgery      Tonsillectomy       Social History     Socioeconomic History    Marital status:    Tobacco Use    Smoking status: Former     Current  packs/day: 0.00     Average packs/day: 0.5 packs/day for 25.0 years (12.5 ttl pk-yrs)     Types: Cigarettes     Start date: 1982     Quit date: 2007     Years since quittin.1    Smokeless tobacco: Former     Quit date: 2008   Vaping Use    Vaping status: Never Used   Substance and Sexual Activity    Alcohol use: Yes     Alcohol/week: 7.0 standard drinks of alcohol     Types: 7 Glasses of wine per week     Comment: Enjoy a glass of wine with dinner    Drug use: No   Other Topics Concern    Caffeine Concern Yes     Comment: 4 cups coffee daily    Left Handed No    Right Handed Yes    Currently spends a great deal of time in the sun No    History of tanning No    Hx of Spending Great Deal of Time in Sun No    Bad sunburns in the past No    Tanning Salons in the Past No    Hx of Radiation Treatments Yes         Family History   Problem Relation Age of Onset    Cancer Father         lung    Dementia Mother         7 yrs Nursing home    Cancer Sister         bn brain tumor    Colon Cancer Brother     Prostate Cancer Brother         lives in [place]    Cancer Brother         Liver    Cancer Brother         Prostate    Diabetes Brother     Hypertension Brother     Breast Cancer Self 53            Ovarian Cancer Neg          PHYSICAL EXAM:    /78 (BP Location: Left arm, Patient Position: Sitting, Cuff Size: adult)   Pulse 81   Temp 98.2 °F (36.8 °C) (Oral)   Resp 18   Ht 1.727 m (5' 8\")   Wt 75.3 kg (166 lb)   SpO2 98%   BMI 25.24 kg/m²   Wt Readings from Last 6 Encounters:   06/10/24 75.3 kg (166 lb)   24 74.8 kg (164 lb 12.8 oz)   24 74.2 kg (163 lb 9.6 oz)   23 73.8 kg (162 lb 9.6 oz)   23 70.9 kg (156 lb 6.4 oz)   23 69.9 kg (154 lb 3.2 oz)     Physical Exam  General: Patient is alert, not in acute distress.  HEENT: EOMs intact. PERRL.   Neck: No JVD. No palpable lymphadenopathy. Neck is supple.  Chest: Clear to auscultation.  Breasts:   Deferred.  Heart: Regular rate and rhythm.   Abdomen: Soft, non tender with good bowel sounds.  Extremities: No edema.  Neurological: Grossly intact.   Lymphatics: There is no palpable lymphadenopathy throughout in the cervical, supraclavicular, axillary, or inguinal regions.  Psych/Depression: nl        ASSESSMENT/PLAN:     1. Iron deficiency anemia, unspecified iron deficiency anemia type      Iron deficiency anemia:     Patient received 200 mg of Venofer x3 doses.  Last completed March 2024.  Patient has been on oral maintenance iron therapy, which she is taking daily.      This most recent course of iron infusions was due to worsening hemoglobin with low ferritin.  Today's iron studies, show that the patient still has iron deficiency, which is slightly worse than previously seen in the month of August 2023.      Will proceed with feraheme 510 mg x 2 due to shortage of venofer.     She is to follow-up with the GI for recall EGD due to Figueroa's esophagus.  This is already scheduled for 7/2/2024.    We will have 3 months follow-up with CBC and iron studies to ensure that patient still has response from iron infusions.    For history of breast cancer, she is scheduled for screening mammogram on 7/15/2024, she will have follow-up for her breast imaging and for history of breast cancer surveillance in approximately 6 months time.    No orders of the defined types were placed in this encounter.    MDM high risk medication ordered  Continuity of complex medical care.    Results From Past 48 Hours:  Recent Results (from the past 48 hour(s))   IRON AND TIBC [E]    Collection Time: 06/10/24  1:52 PM   Result Value Ref Range    Iron 33 (L) 50 - 170 ug/dL    Transferrin 222 (L) 250 - 380 mg/dL    Total Iron Binding Capacity 331 250 - 425 ug/dL    % Saturation 10 (L) 15 - 50 %   FERRITIN [E]    Collection Time: 06/10/24  1:52 PM   Result Value Ref Range    Ferritin 13.3 (L) 18.0 - 340.0 ng/mL   CBC W/ DIFFERENTIAL     Collection Time: 06/10/24  1:52 PM   Result Value Ref Range    WBC 6.6 4.0 - 11.0 x10(3) uL    RBC 3.66 (L) 3.80 - 5.30 x10(6)uL    HGB 11.3 (L) 12.0 - 16.0 g/dL    HCT 34.2 (L) 35.0 - 48.0 %    MCV 93.4 80.0 - 100.0 fL    MCH 30.9 26.0 - 34.0 pg    MCHC 33.0 31.0 - 37.0 g/dL    RDW-SD 52.8 (H) 35.1 - 46.3 fL    RDW 15.2 (H) 11.0 - 15.0 %    .0 150.0 - 450.0 10(3)uL    Neutrophil Absolute Prelim 4.30 1.50 - 7.70 x10 (3) uL    Neutrophil Absolute 4.30 1.50 - 7.70 x10(3) uL    Lymphocyte Absolute 1.45 1.00 - 4.00 x10(3) uL    Monocyte Absolute 0.65 0.10 - 1.00 x10(3) uL    Eosinophil Absolute 0.10 0.00 - 0.70 x10(3) uL    Basophil Absolute 0.03 0.00 - 0.20 x10(3) uL    Immature Granulocyte Absolute 0.02 0.00 - 1.00 x10(3) uL    Neutrophil % 65.7 %    Lymphocyte % 22.1 %    Monocyte % 9.9 %    Eosinophil % 1.5 %    Basophil % 0.5 %    Immature Granulocyte % 0.3 %       Imaging & Referrals:  None   No orders of the defined types were placed in this encounter.    Component      Latest Ref Rng 8/23/2023 2/26/2024 6/10/2024   WBC      4.0 - 11.0 x10(3) uL 5.2  6.7  6.6    RBC      3.80 - 5.30 x10(6)uL 4.44  3.63 (L)  3.66 (L)    Hemoglobin      12.0 - 16.0 g/dL 13.8  11.5 (L)  11.3 (L)    Hematocrit      35.0 - 48.0 % 40.6  34.1 (L)  34.2 (L)    MCV      80.0 - 100.0 fL 91.4  93.9  93.4    MCH      26.0 - 34.0 pg 31.1  31.7  30.9    MCHC      31.0 - 37.0 g/dL 34.0  33.7  33.0    RDW-SD      35.1 - 46.3 fL 57.1 (H)  46.7 (H)  52.8 (H)    RDW      11.0 - 15.0 % 17.3 (H)  13.7  15.2 (H)    Platelet Count      150.0 - 450.0 10(3)uL 253.0  295.0  309.0    Prelim Neutrophil Abs      1.50 - 7.70 x10 (3) uL 2.41  4.46  4.30    Neutrophils Absolute      1.50 - 7.70 x10(3) uL 2.41  4.46  4.30    Lymphocytes Absolute      1.00 - 4.00 x10(3) uL 1.90  1.32  1.45    Monocytes Absolute      0.10 - 1.00 x10(3) uL 0.61  0.69  0.65    Eosinophils Absolute      0.00 - 0.70 x10(3) uL 0.20  0.15  0.10    Basophils Absolute      0.00 -  0.20 x10(3) uL 0.04  0.05  0.03    Immature Granulocyte Absolute      0.00 - 1.00 x10(3) uL 0.01  0.02  0.02    Neutrophils %      % 46.5  66.8  65.7    Lymphocytes %      % 36.8  19.7  22.1    Monocytes %      % 11.8  10.3  9.9    Eosinophils %      % 3.9  2.2  1.5    Basophils %      % 0.8  0.7  0.5    Immature Granulocyte %      % 0.2  0.3  0.3    Glucose      70 - 99 mg/dL 88      Sodium      136 - 145 mmol/L 139      Potassium      3.5 - 5.1 mmol/L 4.1      Chloride      98 - 112 mmol/L 105      Carbon Dioxide, Total      21.0 - 32.0 mmol/L 28.0      ANION GAP      0 - 18 mmol/L 6      BUN      7 - 18 mg/dL 16      CREATININE      0.55 - 1.02 mg/dL 0.90      BUN/CREATININE RATIO      10.0 - 20.0  17.8      CALCIUM      8.5 - 10.1 mg/dL 10.0      CALCULATED OSMOLALITY      275 - 295 mOsm/kg 289      EGFR      >=60 mL/min/1.73m2 68      Albumin      3.4 - 5.0 g/dL 3.7      PHOSPHORUS      2.5 - 4.9 mg/dL 2.4 (L)      Iron, Serum      50 - 170 ug/dL 79  184 (H)  33 (L)    Transferrin      250 - 380 mg/dL 229  213 (L)  222 (L)    Iron Bind.Cap.(TIBC)      250 - 425 ug/dL 341  317  331    Iron Saturation      15 - 50 % 23  58 (H)  10 (L)    FERRITIN      18.0 - 340.0 ng/mL 44.7  12.6 (L)  13.3 (L)

## 2024-06-12 ENCOUNTER — APPOINTMENT (OUTPATIENT)
Dept: HEMATOLOGY/ONCOLOGY | Facility: HOSPITAL | Age: 74
End: 2024-06-12
Attending: INTERNAL MEDICINE
Payer: MEDICARE

## 2024-07-01 ENCOUNTER — APPOINTMENT (OUTPATIENT)
Dept: HEMATOLOGY/ONCOLOGY | Facility: HOSPITAL | Age: 74
End: 2024-07-01
Attending: INTERNAL MEDICINE
Payer: MEDICARE

## 2024-07-02 ENCOUNTER — HOSPITAL ENCOUNTER (OUTPATIENT)
Facility: HOSPITAL | Age: 74
Setting detail: HOSPITAL OUTPATIENT SURGERY
Discharge: HOME | End: 2024-07-02
Attending: INTERNAL MEDICINE | Admitting: INTERNAL MEDICINE
Payer: MEDICARE

## 2024-07-02 ENCOUNTER — ANESTHESIA (OUTPATIENT)
Dept: ENDOSCOPY | Facility: HOSPITAL | Age: 74
End: 2024-07-02
Payer: MEDICARE

## 2024-07-02 ENCOUNTER — TELEPHONE (OUTPATIENT)
Dept: GASTROENTEROLOGY | Facility: CLINIC | Age: 74
End: 2024-07-02

## 2024-07-02 ENCOUNTER — ANESTHESIA EVENT (OUTPATIENT)
Dept: ENDOSCOPY | Facility: HOSPITAL | Age: 74
End: 2024-07-02
Payer: MEDICARE

## 2024-07-02 DIAGNOSIS — K21.9 GASTROESOPHAGEAL REFLUX DISEASE, UNSPECIFIED WHETHER ESOPHAGITIS PRESENT: ICD-10-CM

## 2024-07-02 DIAGNOSIS — K22.719 BARRETT'S ESOPHAGUS WITH DYSPLASIA: ICD-10-CM

## 2024-07-02 DIAGNOSIS — D50.9 IRON DEFICIENCY ANEMIA, UNSPECIFIED IRON DEFICIENCY ANEMIA TYPE: Primary | ICD-10-CM

## 2024-07-02 PROCEDURE — 43239 EGD BIOPSY SINGLE/MULTIPLE: CPT | Performed by: INTERNAL MEDICINE

## 2024-07-02 PROCEDURE — 0DB38ZX EXCISION OF LOWER ESOPHAGUS, VIA NATURAL OR ARTIFICIAL OPENING ENDOSCOPIC, DIAGNOSTIC: ICD-10-PCS | Performed by: INTERNAL MEDICINE

## 2024-07-02 RX ORDER — NALOXONE HYDROCHLORIDE 0.4 MG/ML
0.08 INJECTION, SOLUTION INTRAMUSCULAR; INTRAVENOUS; SUBCUTANEOUS ONCE AS NEEDED
Status: DISCONTINUED | OUTPATIENT
Start: 2024-07-02 | End: 2024-07-02

## 2024-07-02 RX ORDER — LIDOCAINE HYDROCHLORIDE 10 MG/ML
INJECTION, SOLUTION EPIDURAL; INFILTRATION; INTRACAUDAL; PERINEURAL AS NEEDED
Status: DISCONTINUED | OUTPATIENT
Start: 2024-07-02 | End: 2024-07-02 | Stop reason: SURG

## 2024-07-02 RX ORDER — SODIUM CHLORIDE, SODIUM LACTATE, POTASSIUM CHLORIDE, CALCIUM CHLORIDE 600; 310; 30; 20 MG/100ML; MG/100ML; MG/100ML; MG/100ML
INJECTION, SOLUTION INTRAVENOUS CONTINUOUS
Status: DISCONTINUED | OUTPATIENT
Start: 2024-07-02 | End: 2024-07-02

## 2024-07-02 RX ADMIN — SODIUM CHLORIDE, SODIUM LACTATE, POTASSIUM CHLORIDE, CALCIUM CHLORIDE: 600; 310; 30; 20 INJECTION, SOLUTION INTRAVENOUS at 13:13:00

## 2024-07-02 RX ADMIN — SODIUM CHLORIDE, SODIUM LACTATE, POTASSIUM CHLORIDE, CALCIUM CHLORIDE: 600; 310; 30; 20 INJECTION, SOLUTION INTRAVENOUS at 12:56:00

## 2024-07-02 RX ADMIN — LIDOCAINE HYDROCHLORIDE 50 MG: 10 INJECTION, SOLUTION EPIDURAL; INFILTRATION; INTRACAUDAL; PERINEURAL at 12:56:00

## 2024-07-02 NOTE — TELEPHONE ENCOUNTER
Elective EGD examination performed today at [facility] for follow-up of Figuerao's esophagus.    Ongoing, persistent iron deficiency anemia as per latest routine labs 6/10/2024 despite outpatient iron infusions June 2023 and March 2024.  Now following with Dr. Pilo Jaquez.  Another two infusions of the Feraheme product has been scheduled.  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  We discussed \"Given\" small bowel Video Capsule Endoscopy Examination (VCE) to evaluate for small bowel AVM lesions other causes of bleeding.  MsJackelyn Henry would like to proceed.    GI schedulers -    Please call Ms. Figueroa to schedule the \"Given\" Video Capsule Enteroscopy Examination (VCE) at [facility].    No sedation    Mag citrate prep    Diagnosis = iron deficiency anemia    - cb

## 2024-07-02 NOTE — ANESTHESIA POSTPROCEDURE EVALUATION
Patient: Lashae Figueroa    Procedure Summary       Date: 07/02/24 Room / Location: [facility] ENDOSCOPY 05 / EM ENDOSCOPY    Anesthesia Start: 1255 Anesthesia Stop: 1314    Procedure: ESOPHAGOGASTRODUODENOSCOPY Diagnosis:       Gastroesophageal reflux disease, unspecified whether esophagitis present      Hernandez's esophagus with dysplasia      (hiatal hernia, hernandez s, esophagus)    Surgeons: Rohit Sepulveda MD Anesthesiologist: Sania Lagos CRNA    Anesthesia Type: general ASA Status: 2            Anesthesia Type: general    Vitals Value Taken Time   /78 07/02/24 1313   Temp 97.2 °F (36.2 °C) 07/02/24 1313   Pulse 68 07/02/24 1313   Resp 18 07/02/24 1313   SpO2 100 % 07/02/24 1313       [facility] AN Post Evaluation:   Patient Evaluated in PACU  Patient Participation: complete - patient participated  Level of Consciousness: sleepy but conscious  Pain Score: 0  Pain Management: adequate  Airway Patency:patent  Dental exam unchanged from preop  Yes    Cardiovascular Status: acceptable  Respiratory Status: acceptable  Postoperative Hydration acceptable    Sania Lagos CRNA  7/2/2024 1:14 PM

## 2024-07-02 NOTE — DISCHARGE INSTRUCTIONS
.  .  .  Notes from Dr. Sepulveda:    Much better exam this year on the pantoprazole antiacid medication.  The previous ulcers in your lower esophagus have healed.  You do have a mild case of that \"Figueroa's esophagus.\"  It looked fine today.  I took some random samples or biopsies.  We should continue to repeat these stomach endoscopy examinations every 2-3 years.  If you wish, we should further evaluate the iron deficiency problem with the \"capsule\" pill camera test of your small intestine.  .  .  .   Home Care Instructions for Gastroscopy with Sedation    Diet:  - Resume your regular diet as tolerated unless otherwise instructed.  - Start with light meals to minimize bloating.  - Do not drink alcohol today.    Medication:  - If you have questions about resuming your normal medications, please contact your Primary Care Physician.    Activities:  - Take it easy today. Do not return to work today.  - Do not drive today.  - Do not operate any machinery today (including kitchen equipment).    Gastroscopy:  - You may have a sore throat for 2-3 days following the exam. This is normal. Gargling with warm salt water (1/2 tsp salt to 1 glass warm water) or using throat lozenges will help.  - If you experience any sharp pain in your neck, abdomen or chest, vomiting of blood, oral temperature over 100 degrees Fahrenheit, light-headedness or dizziness, or any other problems, contact your doctor.    **If unable to reach your doctor, please go to the [facility] Emergency Room**    - Your referring physician will receive a full report of your examination.  - If you do not hear from your doctor's office within two weeks of your biopsy, please call them for your results.    You may be able to see your laboratory results in [employer] between 4 and 7 business days.  In some cases, your physician may not have viewed the results before they are released to [employer].  If you have questions regarding your results contact the  physician who ordered the test/exam by phone or via [employer] by choosing \"Ask a Medical Question.\"

## 2024-07-02 NOTE — OPERATIVE REPORT
[facility]      EGD PROCEDURE REPORT    DATE OF PROCEDURE:  7/2/2024    PCP: No primary care provider on file.     PREOPERATIVE DIAGNOSIS:  GERD symptoms, Figueroa's esophagus, iron deficiency anemia     POSTOPERATIVE DIAGNOSIS:  See impression.     SURGEON:  Rohit Sepulveda M.D.     SEDATION:    MAC anesthesia provided by the Anesthesia Service.  MAC anesthesia requested due to anticipated intolerance of EGD examination and biopsies under safe doses conscious sedation medications    EGD PROCEDURE:    After the nature and risks of EGD examination with possible biopsy were discussed with the patient and all questions answered, informed consent was obtained.  The patient was sedated as above.    Once sedated, the Olympus adult diagnostic gastroscope was placed in the patient's mouth and advanced under direct visualization through the oropharynx into the esophagus, on down through the stomach and pylorus to the second portion of the duodenum.  Retroflexion was performed in the stomach.    Estimated blood loss: none/insignificant       EGD FINDINGS:    Esophagus and GE junction: Similar large 4+ cm hiatal hernia, but interval resolution of previous acute reflux esophagitis and esophageal ulcers.    No ulcerations or erosions distal esophagus or GE junction on today's exam.    Top of the gastric folds measured at 36 cm from the incisors.  There is over 2 cm of circumferential smooth possible Figueroa's esophagus mucosa above this (unclear if hiatal hernia sac/gastric mucosa or Figueroa's mucosa), and then one 15+ mm tongue of mucosa extending up circumferentially from here to 31 cm from the incisors.  Total of 5 cm from the top of the gastric folds to the top of this tongue of suspected Figueroa's epithelium.    No mucosal nodules or irregularities.    After completing exam of the stomach and duodenum, circumferential random biopsies obtained just above the gastric folds at about 34 cm, then of the  tongue of typical Figueroa's appearing mucosa at about 32 cm from the incisors.    Stomach: Clear secretions present.  Normal gastric mucosa proximally and distally, normal pylorus.    Duodenum: Clear secretions present. Normal duodenal bulb and second portion duodenum.    IMPRESSION:  4+ cm hiatal hernia as previously described with up to ?5 cm of Figueroa's mucosa above it, with circumferential mucosa about 2 cm up and then one wide tongue extending at least 15 mm up from there as above.  Multiple biopsies obtained.  Reassuring exam of stomach and duodenum today; no AVM lesions seen.    RECOMMENDATIONS:    Followup above distal esophagus mucosal biopsy results.   Continue indefinite PPI medication  Repeat EGD examination and biopsies every 2-3 years  Ongoing, persistent iron deficiency anemia as per latest routine labs 6/10/2024 despite outpatient iron infusions June 2023 and March 2024.  Now following with Dr. Pilo Jaquez.  Another two infusions of the Feraheme product has been scheduled.  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  We discussed \"Given\" small bowel Video Capsule Endoscopy Examination (VCE) to evaluate for small bowel AVM lesions other causes of bleeding.  Ms. Figueroa would like to proceed.

## 2024-07-02 NOTE — ANESTHESIA PREPROCEDURE EVALUATION
Anesthesia PreOp Note    HPI:     Lashae Figueroa is a 73 year old female who presents for preoperative consultation requested by: Rohit Sepulveda MD    Date of Surgery: 7/2/2024    Procedure(s):  ESOPHAGOGASTRODUODENOSCOPY  Indication: Gastroesophageal reflux disease, unspecified whether esophagitis present /Figueroa's esophagus with dysplasia    Relevant Problems   No relevant active problems       NPO:  Last Liquid Consumption Date: 07/02/24  Last Liquid Consumption Time: 0000  Last Solid Consumption Date: 07/01/24  Last Solid Consumption Time: 1900  Last Liquid Consumption Date: 07/02/24          History Review:  Patient Active Problem List    Diagnosis Date Noted   • Senile purpura (HCC) 06/06/2023   • Iron deficiency anemia 05/18/2023   • Iron malabsorption (HCC) 05/18/2023   • Encounter for screening for cardiovascular disorders 05/02/2023   • Osteopenia of neck of femur 08/27/2021   • Asymptomatic menopause 08/27/2020   • Breast cancer (HCC) 10/05/2016   • Female hypogonadism due to aromatase inhibitor therapy 09/03/2015   • Encounter for monitoring adjuvant hormonal therapy 09/28/2014   • Malignant neoplasm of upper-outer quadrant of right breast in female, estrogen receptor positive (HCC) 06/25/2014       Past Medical History:   • Allergic rhinitis    Flowers and evergreens   • Anal lesion    Siavelis, Edil; Condyloma, squamous   • Arthritis    Rt wrist and hand   • Breast cancer (HCC)   • Breast cancer (HCC)    lumpectomy, axillary sampling, Adriamycin & Cytoxan   • Cancer (HCC)    Right breast cancer surgery, chemo, radiation   • Encounter for adjustment or management of vascular access device    venous access; port removal   • Encounter for fitting of portacath    venous access; portacath placement   • Esophageal reflux   • H/O arthroscopy    RT wrist   • Osteopenia of neck of femur   • PONV (postoperative nausea and vomiting)   • Removal of pin, plate, miriam, or screw    removal of screws    • S/P T&A (status post tonsillectomy and adenoidectomy)   • Status post osteotomy    ulnar osteotomy   • Stress at home    mother ECF/sister/son's wedding   • Vaccine for diphtheria-tetanus-pertussis, combined    as per NG:  \"DTAP vaccine\"       Past Surgical History:   Procedure Laterality Date   • Adenoidectomy     • Breast lumpectomy     • Chemotherapy     • Colonoscopy  2009    Bhargav Wilson; condyloma acuminatum, rectal hyperplastic polyp   • Colonoscopy     • Colonoscopy N/A 2023    Procedure: COLONOSCOPY;  Surgeon: Rohit Sepulveda MD;  Location: [facility] ENDOSCOPY   • Laryngoscopy,dirct,op scope,fb remv  2016   • Lumpectomy right Right    •   30 yrs old  31 yrs old    80 daughter,, 81 son   • Other surgical history  1954    Removal of birthmark on right upper leg.     -ulna osteo   • Radiation right Right    • Skin surgery     • Tonsillectomy         Medications Prior to Admission   Medication Sig Dispense Refill Last Dose   • Multiple Vitamins-Minerals (PRESERVISION AREDS 2 OR) Take by mouth 2 (two) times a day.   2024   • latanoprost 0.005 % Ophthalmic Solution Place 1 drop into both eyes nightly. TAKE AT BEDTIME   2024   • SIMBRINZA 1-0.2 % Ophthalmic Suspension Place 1 drop into both eyes in the morning and 1 drop before bedtime.   2024   • pantoprazole 40 MG Oral Tab EC Take 1 tablet (40 mg total) by mouth 2 (two) times daily before meals.   2024   • Multiple Vitamins-Minerals (CENTRUM SILVER ADULT 50+ OR) Take by mouth daily.   2024     Current Facility-Administered Medications Ordered in Epic   Medication Dose Route Frequency Provider Last Rate Last Admin   • lactated ringers infusion   Intravenous Continuous Rohit Sepulveda MD         No current [facility]-ordered outpatient medications on file.       Allergies   Allergen Reactions   • Ambien [Zolpidem] INSOMNIA   • Antihistamine [Clarithromycin] INSOMNIA   •  Dalmane [Flurazepam] OTHER (SEE COMMENTS)     Severe headaches   • Decongestant [Oxymetazoline] JITTERY   • Duricef [Cefadroxil] ITCHING     dermatitis   • Penicillins HIVES and RASH   • Codeine ANAPHYLAXIS   • Demerol [Meperidine] OTHER (SEE COMMENTS)     Therapeutic failure   • Ibuprofen OTHER (SEE COMMENTS)   • Rash Away  [Sensi-Care Protective Barrier] ANAPHYLAXIS   • Rofecoxib ANXIETY   • Vioxx [Rofecoxib] OTHER (SEE COMMENTS)       Family History   Problem Relation Age of Onset   • Cancer Father         lung   • Dementia Mother         7 yrs Nursing home   • Cancer Sister         bn brain tumor   • Colon Cancer Brother    • Prostate Cancer Brother         lives in [place]   • Cancer Brother         Liver   • Cancer Brother         Prostate   • Diabetes Brother    • Hypertension Brother    • Breast Cancer Self 53           • Ovarian Cancer Neg      Social History     Socioeconomic History   • Marital status:    Tobacco Use   • Smoking status: Former     Current packs/day: 0.00     Average packs/day: 0.5 packs/day for 25.0 years (12.5 ttl pk-yrs)     Types: Cigarettes     Start date: 1982     Quit date: 2007     Years since quittin.2   • Smokeless tobacco: Former     Quit date: 2008   Vaping Use   • Vaping status: Never Used   Substance and Sexual Activity   • Alcohol use: Yes     Alcohol/week: 7.0 standard drinks of alcohol     Types: 7 Glasses of wine per week     Comment: Enjoy a glass of wine with dinner   • Drug use: No   Other Topics Concern   • Caffeine Concern Yes     Comment: 4 cups coffee daily   • Left Handed No   • Right Handed Yes   • Currently spends a great deal of time in the sun No   • History of tanning No   • Hx of Spending Great Deal of Time in Sun No   • Bad sunburns in the past No   • Tanning Salons in the Past No   • Hx of Radiation Treatments Yes       Available pre-op labs reviewed.  Lab Results   Component Value Date    WBC 6.6 06/10/2024    RBC 3.66 (L)  06/10/2024    HGB 11.3 (L) 06/10/2024    HCT 34.2 (L) 06/10/2024    MCV 93.4 06/10/2024    MCH 30.9 06/10/2024    MCHC 33.0 06/10/2024    RDW 15.2 (H) 06/10/2024    .0 06/10/2024             Vital Signs:  Body mass index is 25.24 kg/m².   height is 1.727 m (5' 8\") and weight is 75.3 kg (166 lb). Her blood pressure is 107/96 (abnormal) and her pulse is 81. Her respiration is 16 and oxygen saturation is 99%.   Vitals:    06/25/24 1221 07/02/24 1220   BP:  (!) 107/96   Pulse:  81   Resp:  16   SpO2:  99%   Weight: 75.3 kg (166 lb)    Height: 1.727 m (5' 8\")         Anesthesia Evaluation     Patient summary reviewed and Nursing notes reviewed    History of anesthetic complications   Airway   Mallampati: I  TM distance: >3 FB  Neck ROM: full  Dental - Dentition appears grossly intact     Pulmonary - normal exam   Cardiovascular - normal exam  Exercise tolerance: good    Neuro/Psych - negative ROS     GI/Hepatic/Renal    (+) GERD well controlled    Endo/Other    (+) arthritis  Abdominal  - normal exam               Anesthesia Plan:   ASA:  2  Plan:   General  Informed Consent Plan and Risks Discussed With:  Patient  Discussed plan with:  Surgeon    I have informed Lashae Figueroa and/or legal guardian or family member of the nature of the anesthetic plan, benefits, risks including possible dental damage if relevant, major complications, and any alternative forms of anesthetic management.   All of the patient's questions were answered to the best of my ability. The patient desires the anesthetic management as planned.  Sania Lagos CRNA  7/2/2024 12:20 PM  Present on Admission:  **None**

## 2024-07-03 ENCOUNTER — APPOINTMENT (OUTPATIENT)
Dept: HEMATOLOGY/ONCOLOGY | Facility: HOSPITAL | Age: 74
End: 2024-07-03
Attending: INTERNAL MEDICINE
Payer: MEDICARE

## 2024-07-03 VITALS
DIASTOLIC BLOOD PRESSURE: 69 MMHG | HEIGHT: 68 IN | BODY MASS INDEX: 25.16 KG/M2 | HEART RATE: 64 BPM | RESPIRATION RATE: 19 BRPM | OXYGEN SATURATION: 100 % | SYSTOLIC BLOOD PRESSURE: 140 MMHG | TEMPERATURE: 97 F | WEIGHT: 166 LBS

## 2024-07-08 NOTE — TELEPHONE ENCOUNTER
Yes this will be perfect.  There is enough time after the infusions and these IV infusions do not directly interfere.  It is the iron pills that release what looks like tar in the small intestines and prevent us from seeing anything on our exams.    Thank you Radhika Simpson cb

## 2024-07-08 NOTE — TELEPHONE ENCOUNTER
Vadim Wilson   Scheduled for:  VCE [id number]  Provider Name:    Date:  Monday, 07/22/2024  Location:  [facility]   Sedation:  None  Time:  7;30am (pt is aware to arrive at 6;30am     Prep:  Mag citrate prep   Meds/Allergies Reconciled?:  Yes    Diagnosis with codes:  Mag citrate prep   Was patient informed to call insurance with codes (Y/N):  Yes     Referral sent?:  Referral was sent at the time of electronic surgical scheduling.    EM or [facility] notified?:  I sent an electronic request to Endo Scheduling and received a confirmation today.       Medication Orders:  None  Misc Orders:  None     Further instructions given by staff:  I discussed the prep instructions with the patient which she verbally understood and is aware that I will send the instructions today.via [email or web address]

## 2024-07-08 NOTE — TELEPHONE ENCOUNTER
Smita Enrique            Patient has 2 infusion in July 07/10 and 07/17 please advise if its okay to scheduled VCE week on 7/22

## 2024-07-09 ENCOUNTER — TELEPHONE (OUTPATIENT)
Facility: CLINIC | Age: 74
End: 2024-07-09

## 2024-07-09 NOTE — TELEPHONE ENCOUNTER
Patient states that she scheduled a Video Endoscopy yesterday but in the instructions she is suppose to stop taking Iron 5 days prior to procedure.  Patient states that she is scheduled for Iron infusions on 7/10/24 and 7/17/24.  Patient states that she can be reached until 10:30 this morning or after 2:30 pm.   Please call

## 2024-07-09 NOTE — TELEPHONE ENCOUNTER
Spoke with patient about VCE I explained to the patient they per  its ok for her to have her iron infusions and proceed with the procedure on July 22 patient understood

## 2024-07-10 ENCOUNTER — OFFICE VISIT (OUTPATIENT)
Dept: HEMATOLOGY/ONCOLOGY | Facility: HOSPITAL | Age: 74
End: 2024-07-10
Attending: INTERNAL MEDICINE
Payer: MEDICARE

## 2024-07-10 VITALS
HEART RATE: 66 BPM | TEMPERATURE: 98 F | OXYGEN SATURATION: 99 % | SYSTOLIC BLOOD PRESSURE: 141 MMHG | RESPIRATION RATE: 18 BRPM | DIASTOLIC BLOOD PRESSURE: 79 MMHG

## 2024-07-10 DIAGNOSIS — D50.9 IRON DEFICIENCY ANEMIA, UNSPECIFIED IRON DEFICIENCY ANEMIA TYPE: Primary | ICD-10-CM

## 2024-07-10 PROCEDURE — 96374 THER/PROPH/DIAG INJ IV PUSH: CPT

## 2024-07-10 NOTE — PROGRESS NOTES
Pt here for Feraheme 1 of 2 . Pt denies any issues or concerns.       Ordering Provider: Davis Gonsales Exp: 2 of 2     Pt tolerated infusion without difficulty or complaint. Allowed patient to stay for observation while saline was running due to previous experience of dizziness following venofer. Patient did not feel dizzy upon discharge, encouraged her to hydrate over the next 24 hours.     Reviewed next apt date/time: 7/17/24 @ 1400      Education Record  Learner:  Patient and Spouse  Disease / Diagnosis: Anemia  Barriers / Limitations:  None  Method:  Discussion and Reinforcement  General Topics:  Medication, Side effects and symptom management, and Plan of care reviewed  Outcome:  Shows understanding

## 2024-07-15 ENCOUNTER — HOSPITAL ENCOUNTER (OUTPATIENT)
Dept: MAMMOGRAPHY | Facility: HOSPITAL | Age: 74
Discharge: HOME | End: 2024-07-15
Attending: INTERNAL MEDICINE
Payer: MEDICARE

## 2024-07-15 DIAGNOSIS — Z12.31 SCREENING MAMMOGRAM, ENCOUNTER FOR: ICD-10-CM

## 2024-07-15 PROCEDURE — 77067 SCR MAMMO BI INCL CAD: CPT | Performed by: INTERNAL MEDICINE

## 2024-07-15 PROCEDURE — 77063 BREAST TOMOSYNTHESIS BI: CPT | Performed by: INTERNAL MEDICINE

## 2024-07-17 ENCOUNTER — OFFICE VISIT (OUTPATIENT)
Dept: HEMATOLOGY/ONCOLOGY | Facility: HOSPITAL | Age: 74
End: 2024-07-17
Attending: INTERNAL MEDICINE
Payer: MEDICARE

## 2024-07-17 VITALS
DIASTOLIC BLOOD PRESSURE: 80 MMHG | OXYGEN SATURATION: 98 % | TEMPERATURE: 98 F | HEART RATE: 63 BPM | RESPIRATION RATE: 18 BRPM | SYSTOLIC BLOOD PRESSURE: 143 MMHG

## 2024-07-17 DIAGNOSIS — D50.9 IRON DEFICIENCY ANEMIA, UNSPECIFIED IRON DEFICIENCY ANEMIA TYPE: Primary | ICD-10-CM

## 2024-07-17 PROCEDURE — 96365 THER/PROPH/DIAG IV INF INIT: CPT

## 2024-07-17 NOTE — PROGRESS NOTES
Pt here for Feraheme 2 of 2 . Pt denies any issues or concerns.      Ordering Provider: Davis       Pt tolerated infusion without difficulty or complaint. Reviewed next apt date/time: She is aware of follow up labs/appt with Dr Cannon      Education Record  Learner:  Patient  Disease / Diagnosis: Anemia  Barriers / Limitations:  None  Method:  Brief focused  General Topics:  Side effects and symptom management and Plan of care reviewed  Outcome:  Shows understanding

## 2024-07-22 ENCOUNTER — HOSPITAL ENCOUNTER (OUTPATIENT)
Facility: HOSPITAL | Age: 74
Setting detail: HOSPITAL OUTPATIENT SURGERY
Discharge: HOME | End: 2024-07-22
Attending: INTERNAL MEDICINE | Admitting: INTERNAL MEDICINE
Payer: MEDICARE

## 2024-07-22 PROCEDURE — 0DJ07ZZ INSPECTION OF UPPER INTESTINAL TRACT, VIA NATURAL OR ARTIFICIAL OPENING: ICD-10-PCS | Performed by: INTERNAL MEDICINE

## 2024-07-23 VITALS
DIASTOLIC BLOOD PRESSURE: 89 MMHG | OXYGEN SATURATION: 99 % | WEIGHT: 163 LBS | RESPIRATION RATE: 20 BRPM | SYSTOLIC BLOOD PRESSURE: 157 MMHG | HEIGHT: 67 IN | BODY MASS INDEX: 25.58 KG/M2 | HEART RATE: 80 BPM

## 2024-07-25 ENCOUNTER — TELEPHONE (OUTPATIENT)
Facility: CLINIC | Age: 74
End: 2024-07-25

## 2024-07-25 DIAGNOSIS — D50.9 IRON DEFICIENCY ANEMIA, UNSPECIFIED IRON DEFICIENCY ANEMIA TYPE: Primary | ICD-10-CM

## 2024-07-25 DIAGNOSIS — R93.3: ICD-10-CM

## 2024-07-25 NOTE — TELEPHONE ENCOUNTER
I spoke to the pt and let her know once Dr Sepulveda reads the capsule study we will call her with results    She is aware Dr Sepulveda is out of the office this week and will be returning next week

## 2024-07-25 NOTE — TELEPHONE ENCOUNTER
Patient requesting to speak with RN regarding Capsule Esophagogastroduodenoscopy results.  Please call.  Thank you.

## 2024-08-02 NOTE — TELEPHONE ENCOUNTER
I spoke to the pt and let her know the study has been downloaded and Dr Sepulveda just needs to read the study    We will call her back with the results    The patient voices understanding and will await a call from Dr Sepulveda or RN with follow up instructions

## 2024-08-06 ENCOUNTER — TELEPHONE (OUTPATIENT)
Facility: CLINIC | Age: 74
End: 2024-08-06

## 2024-08-06 NOTE — TELEPHONE ENCOUNTER
----- Message from Rohit Sepulveda sent at 8/4/2024  4:48 PM CDT -----  Patient aware.  Please recall for repeat EGD examination in 3 years.

## 2024-08-06 NOTE — TELEPHONE ENCOUNTER
VCE Capsule study of 7/22/2024 reviewed this weekend and today.  Report dictated.    Study shows normal small bowel but slow active bleeding in the colon (!!)  We cannot really see what is bleeding in the colon (unprepped and capsule does not see the colon very well).  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    Last colonoscopy examination was 5/23/2023.  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    Unfortunately we need to schedule a repeat colonoscopy examination asap.    I called listed phone number [phone], call goes to voicemail and left a brief message.    Ector, could you try Lashae again this afternoon or Wednesday?    - cb

## 2024-08-07 RX ORDER — SODIUM, POTASSIUM,MAG SULFATES 17.5-3.13G
SOLUTION, RECONSTITUTED, ORAL ORAL
Qty: 1 EACH | Refills: 0 | Status: SHIPPED | OUTPATIENT
Start: 2024-08-07

## 2024-08-07 NOTE — TELEPHONE ENCOUNTER
Recall EGD in 3 years per Dr Sepulveda . EGD done 07/02/2024    Message sent to pt outreach & specialty comments updated.

## 2024-08-07 NOTE — TELEPHONE ENCOUNTER
Dr Sepulveda,    I spoke to Lashae    I reviewed your recommendations below with her    Lashae agrees with the plan to repeat the colonoscopy    Please provide orders for the colonoscopy

## 2024-08-07 NOTE — TELEPHONE ENCOUNTER
PARKER HORAN.    Orders entered for colonoscopy as below.    We saw active COLONIC bleeding on the recent capsule study.  She has iron deficiency anemia.  I am worried about colonic AVM lesion with bleeding or colonic ulcer.  If possible we need to overbook or get her in ASAP for a colonoscopy examination next 2 or 3 weeks.  Hopefully a cancellation will come up otherwise Daniel and Juliann may need to find a way to get her in.    GI schedulers -    Please schedule colonoscopy exam at [facility] ([facility])    BMI Readings from Last 1 Encounters:   07/12/24 25.53 kg/m²     MAC anesthesia     BP Readings from Last 5 Encounters:   07/22/24 157/89   07/17/24 143/80   07/10/24 141/79   07/02/24 140/69   06/10/24 155/78       Suprep small volume bowel prep if covered by insurance, otherwise   Golytely (PEG) 4L bowel prep  [facility]  [facility]      DX = iron deficiency anemia, abnormal capsule study of the colon    Medication instructions:    None

## 2024-08-08 NOTE — TELEPHONE ENCOUNTER
Called patient and let her know it will be 8/13/24 for colonoscopy and to await call to schedule from GI surgery schedulers.

## 2024-08-08 NOTE — TELEPHONE ENCOUNTER
Called patient and let her know that Suprep was sent into pharmacy [facility]. Pt was wondering if she would be able to have the colonoscopy done on 8/13/24?

## 2024-08-08 NOTE — TELEPHONE ENCOUNTER
Patient called to ask if the preparation has been sent to the [facility] in [place]. Please call.

## 2024-08-09 NOTE — TELEPHONE ENCOUNTER
Verified coverage, no PA needed, Medicare primary, [facility] A[employer] supplement secondary, referral updated.

## 2024-08-09 NOTE — TELEPHONE ENCOUNTER
Scheduled for:  Colonoscopy [id number]  Provider Name:     Date:  Tues, 08/13/2024   Location:  [facility]  Sedation:  MAC  Time:  1;30pm (pt is aware [facility] will call with arrival time     Prep:  Suprep   Meds/Allergies Reconciled?:  Yes    Diagnosis with codes:  iron deficiency anemia,D50.9 abnormal capsule study of the colon R93.3  Was patient informed to call insurance with codes (Y/N): Yes      Referral sent?:  Referral was sent at the time of electronic surgical scheduling.    EM or [facility] notified?:  I sent an electronic request to Endo Scheduling and received a confirmation today.       Medication Orders:  None  Misc Orders:  None     Further instructions given by staff:  I discussed the prep instructions with the patient which she verbally understood and is aware that I will send the instructions today.via [email or web address]

## 2024-08-09 NOTE — TELEPHONE ENCOUNTER
Hello PPD         This patient is scheduled on 08/13/2024 at [facility] please advise

## 2024-08-13 ENCOUNTER — HOSPITAL ENCOUNTER (OUTPATIENT)
Facility: HOSPITAL | Age: 74
Setting detail: HOSPITAL OUTPATIENT SURGERY
Discharge: HOME | End: 2024-08-13
Attending: INTERNAL MEDICINE | Admitting: INTERNAL MEDICINE
Payer: MEDICARE

## 2024-08-13 ENCOUNTER — ANESTHESIA EVENT (OUTPATIENT)
Dept: ENDOSCOPY | Facility: HOSPITAL | Age: 74
End: 2024-08-13
Payer: MEDICARE

## 2024-08-13 ENCOUNTER — ANESTHESIA (OUTPATIENT)
Dept: ENDOSCOPY | Facility: HOSPITAL | Age: 74
End: 2024-08-13
Payer: MEDICARE

## 2024-08-13 VITALS
RESPIRATION RATE: 12 BRPM | SYSTOLIC BLOOD PRESSURE: 160 MMHG | OXYGEN SATURATION: 100 % | HEIGHT: 68 IN | BODY MASS INDEX: 24.4 KG/M2 | WEIGHT: 161 LBS | HEART RATE: 65 BPM | DIASTOLIC BLOOD PRESSURE: 73 MMHG

## 2024-08-13 DIAGNOSIS — D50.9 IRON DEFICIENCY ANEMIA, UNSPECIFIED IRON DEFICIENCY ANEMIA TYPE: ICD-10-CM

## 2024-08-13 DIAGNOSIS — R93.3: ICD-10-CM

## 2024-08-13 PROCEDURE — 45388 COLONOSCOPY W/ABLATION: CPT | Performed by: INTERNAL MEDICINE

## 2024-08-13 PROCEDURE — 45385 COLONOSCOPY W/LESION REMOVAL: CPT | Performed by: INTERNAL MEDICINE

## 2024-08-13 PROCEDURE — 0W3P8ZZ CONTROL BLEEDING IN GASTROINTESTINAL TRACT, VIA NATURAL OR ARTIFICIAL OPENING ENDOSCOPIC: ICD-10-PCS | Performed by: INTERNAL MEDICINE

## 2024-08-13 PROCEDURE — 0DBK8ZX EXCISION OF ASCENDING COLON, VIA NATURAL OR ARTIFICIAL OPENING ENDOSCOPIC, DIAGNOSTIC: ICD-10-PCS | Performed by: INTERNAL MEDICINE

## 2024-08-13 RX ORDER — LIDOCAINE HYDROCHLORIDE 10 MG/ML
INJECTION, SOLUTION EPIDURAL; INFILTRATION; INTRACAUDAL; PERINEURAL AS NEEDED
Status: DISCONTINUED | OUTPATIENT
Start: 2024-08-13 | End: 2024-08-13 | Stop reason: SURG

## 2024-08-13 RX ORDER — SODIUM CHLORIDE, SODIUM LACTATE, POTASSIUM CHLORIDE, CALCIUM CHLORIDE 600; 310; 30; 20 MG/100ML; MG/100ML; MG/100ML; MG/100ML
INJECTION, SOLUTION INTRAVENOUS CONTINUOUS
Status: DISCONTINUED | OUTPATIENT
Start: 2024-08-13 | End: 2024-08-13

## 2024-08-13 RX ORDER — NALOXONE HYDROCHLORIDE 0.4 MG/ML
0.08 INJECTION, SOLUTION INTRAMUSCULAR; INTRAVENOUS; SUBCUTANEOUS ONCE AS NEEDED
Status: DISCONTINUED | OUTPATIENT
Start: 2024-08-13 | End: 2024-08-13

## 2024-08-13 RX ADMIN — LIDOCAINE HYDROCHLORIDE 50 MG: 10 INJECTION, SOLUTION EPIDURAL; INFILTRATION; INTRACAUDAL; PERINEURAL at 12:55:00

## 2024-08-13 RX ADMIN — SODIUM CHLORIDE, SODIUM LACTATE, POTASSIUM CHLORIDE, CALCIUM CHLORIDE: 600; 310; 30; 20 INJECTION, SOLUTION INTRAVENOUS at 12:10:00

## 2024-08-13 NOTE — ANESTHESIA PREPROCEDURE EVALUATION
Anesthesia PreOp Note    HPI:     Lashae Figueroa is a 74 year old female who presents for preoperative consultation requested by: Rohit Sepulveda MD    Date of Surgery: 8/13/2024    Procedure(s):  COLONOSCOPY  Indication: Iron deficiency anemia, unspecified iron deficiency anemia type   Gastrointestinal tract abnormal finding on examination    Relevant Problems   No relevant active problems       NPO:   Last solid intake:8/12/24 at 2100   Last liquid intake: 8/13/24 at 4 AM                      History Review:  Patient Active Problem List    Diagnosis Date Noted   • Senile purpura (HCC) 06/06/2023   • Iron deficiency anemia 05/18/2023   • Iron malabsorption (HCC) 05/18/2023   • Encounter for screening for cardiovascular disorders 05/02/2023   • Osteopenia of neck of femur 08/27/2021   • Asymptomatic menopause 08/27/2020   • Breast cancer (HCC) 10/05/2016   • Female hypogonadism due to aromatase inhibitor therapy 09/03/2015   • Encounter for monitoring adjuvant hormonal therapy 09/28/2014   • Malignant neoplasm of upper-outer quadrant of right breast in female, estrogen receptor positive (HCC) 06/25/2014       Past Medical History:   • Allergic rhinitis    Flowers and evergreens   • Anal lesion    Siavelis, Edil; Condyloma, squamous   • Arthritis    Rt wrist and hand   • Breast cancer (HCC)   • Breast cancer (HCC)    lumpectomy, axillary sampling, Adriamycin & Cytoxan   • Cancer (HCC)    Right breast cancer surgery, chemo, radiation   • Encounter for adjustment or management of vascular access device    venous access; port removal   • Encounter for fitting of portacath    venous access; portacath placement   • Esophageal reflux   • H/O arthroscopy    RT wrist   • Osteopenia of neck of femur   • PONV (postoperative nausea and vomiting)   • Removal of pin, plate, miriam, or screw    removal of screws   • S/P T&A (status post tonsillectomy and adenoidectomy)   • Status post osteotomy    ulnar osteotomy   •  Stress at home    mother ECF/sister/son's wedding   • Vaccine for diphtheria-tetanus-pertussis, combined    as per NG:  \"DTAP vaccine\"       Past Surgical History:   Procedure Laterality Date   • Adenoidectomy     • Breast lumpectomy     • Chemotherapy     • Colonoscopy  2009    Bhargav Wilson; condyloma acuminatum, rectal hyperplastic polyp   • Colonoscopy     • Colonoscopy N/A 2023    Procedure: COLONOSCOPY;  Surgeon: Rohit Sepulveda MD;  Location: [facility] ENDOSCOPY   • Laryngoscopy,dirct,op scope,fb remv  2016   • Lumpectomy right Right    •   30 yrs old  31 yrs old    80 daughter,, 81 son   • Other surgical history  1954    Removal of birthmark on right upper leg.     -ulna osteo   • Radiation right Right    • Skin surgery     • Tonsillectomy         Medications Prior to Admission   Medication Sig Dispense Refill Last Dose   • latanoprost 0.005 % Ophthalmic Solution Place 1 drop into both eyes nightly. TAKE AT BEDTIME      • SIMBRINZA 1-0.2 % Ophthalmic Suspension Place 1 drop into both eyes in the morning and 1 drop before bedtime.      • pantoprazole 40 MG Oral Tab EC Take 1 tablet (40 mg total) by mouth 2 (two) times daily before meals.      • Multiple Vitamins-Minerals (CENTRUM SILVER ADULT 50+ OR) Take by mouth daily.      • Na Sulfate-K Sulfate-Mg Sulf (SUPREP BOWEL PREP KIT) 17.5-3.13-1.6 GM/177ML Oral Solution Take as directed 1 each 0    • Multiple Vitamins-Minerals (PRESERVISION AREDS 2+MULTI VIT OR)       • Multiple Vitamins-Minerals (PRESERVISION AREDS 2 OR) Take by mouth 2 (two) times a day.        No current Epic-ordered facility-administered medications on file.     No current [facility]-ordered outpatient medications on file.       Allergies   Allergen Reactions   • Ambien [Zolpidem] INSOMNIA   • Antihistamine [Clarithromycin] INSOMNIA   • Dalmane [Flurazepam] OTHER (SEE COMMENTS)     Severe headaches   • Decongestant [Oxymetazoline]  JITTERY   • Duricef [Cefadroxil] ITCHING     dermatitis   • Penicillins HIVES and RASH   • Rofecoxib ANXIETY   • Codeine ANAPHYLAXIS   • Demerol [Meperidine] OTHER (SEE COMMENTS)     Therapeutic failure   • Ibuprofen OTHER (SEE COMMENTS)   • Rash Away  [Sensi-Care Protective Barrier] ANAPHYLAXIS   • Vioxx [Rofecoxib] OTHER (SEE COMMENTS)       Family History   Problem Relation Age of Onset   • Cancer Father         lung   • Dementia Mother         7 yrs Nursing home   • Cancer Sister         bn brain tumor   • Colon Cancer Brother    • Prostate Cancer Brother         lives in [place]   • Cancer Brother         Liver   • Cancer Brother         Prostate   • Diabetes Brother    • Hypertension Brother    • Breast Cancer Self 53           • Ovarian Cancer Neg      Social History     Socioeconomic History   • Marital status:    Tobacco Use   • Smoking status: Former     Current packs/day: 0.00     Average packs/day: 0.5 packs/day for 25.0 years (12.5 ttl pk-yrs)     Types: Cigarettes     Start date: 1982     Quit date: 2007     Years since quittin.3   • Smokeless tobacco: Former     Quit date: 2008   Vaping Use   • Vaping status: Never Used   Substance and Sexual Activity   • Alcohol use: Yes     Alcohol/week: 7.0 standard drinks of alcohol     Types: 7 Glasses of wine per week     Comment: Enjoy a glass of wine with dinner   • Drug use: No   Other Topics Concern   • Caffeine Concern Yes     Comment: 4 cups coffee daily   • Left Handed No   • Right Handed Yes   • Currently spends a great deal of time in the sun No   • History of tanning No   • Hx of Spending Great Deal of Time in Sun No   • Bad sunburns in the past No   • Tanning Salons in the Past No   • Hx of Radiation Treatments Yes       Available pre-op labs reviewed.  Lab Results   Component Value Date    WBC 6.6 06/10/2024    RBC 3.66 (L) 06/10/2024    HGB 11.3 (L) 06/10/2024    HCT 34.2 (L) 06/10/2024    MCV 93.4 06/10/2024    MCH  30.9 06/10/2024    MCHC 33.0 06/10/2024    RDW 15.2 (H) 06/10/2024    .0 06/10/2024             Vital Signs:  Body mass index is 24.48 kg/m².   height is 1.727 m (5' 8\") and weight is 73 kg (161 lb).   Vitals:    08/09/24 1203   Weight: 73 kg (161 lb)   Height: 1.727 m (5' 8\")        Anesthesia Evaluation     Patient summary reviewed and Nursing notes reviewed    History of anesthetic complications (PONV)   Airway   Mallampati: II  TM distance: >3 FB  Neck ROM: full  Dental - Dentition appears grossly intact         Pulmonary - negative ROS   Cardiovascular   Exercise tolerance: good  (-) pacemaker, hypertension, past MI, CAD, angina    Neuro/Psych    (+)  CVA,      (-) seizures    GI/Hepatic/Renal    (+) GERD well controlled, bowel prep  (-) liver disease, renal disease    Endo/Other    (+) blood dyscrasia (EDITH- h/h 11.3/34.2), arthritis  (-) diabetes mellitus, hypothyroidism, hyperthyroidism    Comments: Breast Cancer  Abdominal                Anesthesia Plan:   ASA:  2  Plan:   MAC  Informed Consent Plan and Risks Discussed With:  Patient  Discussed plan with:  CRNA and surgeon      I have informed Lashae Figueroa and/or legal guardian or family member of the nature of the anesthetic plan, benefits, risks including possible dental damage if relevant, major complications, and any alternative forms of anesthetic management.   All of the patient's questions were answered to the best of my ability. The patient desires the anesthetic management as planned.  Eduardo Brannon CRNA  8/13/2024 11:25 AM  Present on Admission:  **None**

## 2024-08-13 NOTE — ANESTHESIA POSTPROCEDURE EVALUATION
Patient: Lashae Figueroa    Procedure Summary       Date: 08/13/24 Room / Location: [facility] ENDOSCOPY 01 / EM ENDOSCOPY    Anesthesia Start: 1252 Anesthesia Stop:     Procedure: COLONOSCOPY Diagnosis:       Iron deficiency anemia, unspecified iron deficiency anemia type      Gastrointestinal tract abnormal finding on examination      (Iron deficiency anemia, unspecified iron deficiency anemia type )      (Gastrointestinal tract abnormal finding on examination)    Surgeons: Rohit Sepulveda MD Anesthesiologist: Eduardo Brannon CRNA    Anesthesia Type: MAC ASA Status: 2            Anesthesia Type: MAC    Vitals Value Taken Time   /82 08/13/24 1303   Temp / 08/13/24 1303   Pulse 76 08/13/24 1303   Resp 16 08/13/24 1303   SpO2 99% 08/13/24 1303       [facility] AN Post Evaluation:   Patient Evaluated in PACU  Patient Participation: complete - patient participated  Level of Consciousness: awake  Pain Score: 0  Pain Management: adequate  Airway Patency:patent  Dental exam unchanged from preop  Yes    Nausea/Vomiting: none  Cardiovascular Status: acceptable  Respiratory Status: acceptable and room air  Postoperative Hydration acceptable      Eduardo Brannon CRNA  8/13/2024 1:03 PM

## 2024-08-13 NOTE — H&P
History & Physical Examination    Patient Name: Lashae Figueroa  MRN: [id number]  [facility]: [id number]  YOB: 1950    Diagnosis: Iron deficiency anemia, history of colonic AVM lesions, abnormal VCE video capsule endoscopy study of the colon    PRESENT ILLNESS: 74-year-old woman with distant history of breast cancer, history of recurring iron deficiency anemia, known AVM lesions of the GI tract with recent small bowel video capsule enteroscopy examination showing fresh blood, active bleeding likely cecum or ascending colon.  She presents today for elective colonoscopy examination.      BMI Readings from Last 1 Encounters:   08/09/24 24.48 kg/m²         Medications Prior to Admission   Medication Sig Dispense Refill Last Dose    latanoprost 0.005 % Ophthalmic Solution Place 1 drop into both eyes nightly. TAKE AT BEDTIME       SIMBRINZA 1-0.2 % Ophthalmic Suspension Place 1 drop into both eyes in the morning and 1 drop before bedtime.       pantoprazole 40 MG Oral Tab EC Take 1 tablet (40 mg total) by mouth 2 (two) times daily before meals.   8/11/2024    Multiple Vitamins-Minerals (CENTRUM SILVER ADULT 50+ OR) Take by mouth daily.   8/11/2024    Na Sulfate-K Sulfate-Mg Sulf (SUPREP BOWEL PREP KIT) 17.5-3.13-1.6 GM/177ML Oral Solution Take as directed 1 each 0     Multiple Vitamins-Minerals (PRESERVISION AREDS 2+MULTI VIT OR)  (Patient not taking: Reported on 8/13/2024)   Not Taking    Multiple Vitamins-Minerals (PRESERVISION AREDS 2 OR) Take by mouth 2 (two) times a day.   8/11/2024     Current Facility-Administered Medications   Medication Dose Route Frequency    lactated ringers infusion   Intravenous Continuous       Allergies:   Allergies   Allergen Reactions    Ambien [Zolpidem] INSOMNIA    Antihistamine [Clarithromycin] INSOMNIA    Dalmane [Flurazepam] OTHER (SEE COMMENTS)     Severe headaches    Decongestant [Oxymetazoline] JITTERY    Duricef [Cefadroxil] ITCHING     dermatitis    Penicillins HIVES  and RASH    Rofecoxib ANXIETY    Codeine ANAPHYLAXIS    Demerol [Meperidine] OTHER (SEE COMMENTS)     Therapeutic failure    Ibuprofen OTHER (SEE COMMENTS)    Rash Away  [Sensi-Care Protective Barrier] ANAPHYLAXIS    Vioxx [Rofecoxib] OTHER (SEE COMMENTS)       Past Medical History:    Allergic rhinitis    Flowers and evergreens    Anal lesion    Remorenolupe, Edil; Condyloma, squamous    Arthritis    Rt wrist and hand    Breast cancer (HCC)    Breast cancer (HCC)    lumpectomy, axillary sampling, Adriamycin & Cytoxan    Cancer (HCC)    Right breast cancer surgery, chemo, radiation    Encounter for adjustment or management of vascular access device    venous access; port removal    Encounter for fitting of portacath    venous access; portacath placement    Esophageal reflux    H/O arthroscopy    RT wrist    Osteopenia of neck of femur    PONV (postoperative nausea and vomiting)    Removal of pin, plate, miriam, or screw    removal of screws    S/P T&A (status post tonsillectomy and adenoidectomy)    Status post osteotomy    ulnar osteotomy    Stress at home    mother ECF/sister/son's wedding    Vaccine for diphtheria-tetanus-pertussis, combined    as per NG:  \"DTAP vaccine\"     Past Surgical History:   Procedure Laterality Date    Adenoidectomy      Breast lumpectomy      Chemotherapy  2003    Colonoscopy  2009    Bhargav Wilson; condyloma acuminatum, rectal hyperplastic polyp    Colonoscopy      Colonoscopy N/A 2023    Procedure: COLONOSCOPY;  Surgeon: Rohit Sepulveda MD;  Location: [facility] ENDOSCOPY    Laryngoscopy,dirct,op scope,fb remv  2016    Lumpectomy right Right       30 yrs old  31 yrs old    80 daughter,, 81 son    Other surgical history  1954    Removal of birthmark on right upper leg.     -ulna osteo    Radiation right Right     Skin surgery      Tonsillectomy       Family History   Problem Relation Age of Onset    Cancer Father         lung     Dementia Mother         7 yrs Nursing home    Cancer Sister         bn brain tumor    Colon Cancer Brother     Prostate Cancer Brother         lives in [place]    Cancer Brother         Liver    Cancer Brother         Prostate    Diabetes Brother     Hypertension Brother     Breast Cancer Self 53            Ovarian Cancer Neg      Social History     Tobacco Use    Smoking status: Former     Current packs/day: 0.00     Average packs/day: 0.5 packs/day for 25.0 years (12.5 ttl pk-yrs)     Types: Cigarettes     Start date: 1982     Quit date: 2007     Years since quittin.3    Smokeless tobacco: Former     Quit date: 2008   Substance Use Topics    Alcohol use: Yes     Alcohol/week: 7.0 standard drinks of alcohol     Types: 7 Glasses of wine per week     Comment: Enjoy a glass of wine with dinner       SYSTEM Check if Review is Normal Check if Physical Exam is Normal If not normal, please explain:   HEENT [ ] [X ]    NECK & BACK [ ] [ ]    HEART [ X ] [ X ]    LUNGS [ X ] [ X ]    ABDOMEN [ X ] [ X ]    UROGENITAL [ ] [ ]    EXTREMITIES [ ] [ ]    OTHER        See Anesthesia documentation    [ x ] I have discussed the risks and benefits and alternatives with the patient/family.  They understand and agree to proceed with plan of care.  [ x ] I have reviewed the History and Physical done within the last 30 days.  Any changes noted above.    Rohit Sepulveda MD  2024  12:02 PM

## 2024-08-13 NOTE — OPERATIVE REPORT
[facility]    COLONOSCOPY PROCEDURE REPORT     DATE OF PROCEDURE:  8/13/2024     PCP: Pilo Cannon MD     PREOPERATIVE DIAGNOSIS:   Iron deficiency anemia, history of colonic AVM lesions, abnormal VCE video capsule endoscopy study of the colon     POSTOPERATIVE DIAGNOSIS:  See impression.     SURGEON:  Rohit Sepulveda M.D.     SEDATION:    MAC anesthesia provided by the Anesthesia Service.  MAC anesthesia requested due to  anticipated intolerance of colonoscopy examination under safe doses conscious sedation medications     COLONOSCOPY PROCEDURE:   After the nature and risks of colonoscopy examination under MAC anesthesia were discussed with the patient and all questions answered, informed consent was obtained.  The patient was sedated as above.      Digital rectal exam was performed which showed no masses.  The Olympus pediatric video colonoscope was placed in the patient's rectum and advanced under direct visualization through the entire length of the colon up to the cecum and terminal ileum.  Retroflex exam performed up the ascending colon to the hepatic flexure.  The cecum was confirmed by landmarks including appendiceal orifice, cecal trifold, ileocecal valve.  Retroflexion was performed in the rectum.    The quality of the prep was excellent.    Estimated blood loss: none/insignificant    Over 50 minutes withdrawal time from the cecum today looking for the suspected AVM lesion.     COLONOSCOPY FINDINGS:    No blood residue or fresh blood anywhere in the colon today during a prolonged exam.  Subtle 4mm AVM lesion versus suction artifact encountered on the ileocecal valve, ablated using APC cautery at colon setting with no bleeding.  Second more typical 6mm faint AVM lesion encountered in the cecum immediately across from ileocecal valve, pretty much exactly where the recent video capsule encountered a pool of blood.  This was photographed and cauterized using APC cautery at colon  settings.  The APC cautery provoked immediate bleeding from the lesion with the first few doses of cautery which did stop with subsequent treatments.  Hemostasis achieved.  Photographs taken during and after the cautery ablation treatment.  Incidental 6 mm sessile colon polyp found and removed from the mid ascending colon by cold snare polypectomy technique, suctioned out.  Mild diverticulosis of the sigmoid colon.  Medium sized internal hemorrhoids.    RECOMMENDATIONS:  High fiber diet.  Follow-up above colon polyp pathology results.  Continue to monitor H&H and iron levels, iron infusions as needed.  Consideration for repeat colonoscopy examination in 5 years for colon cancer screening.  No aspirin or NSAID medications for next 5 days to prevent bleeding

## 2024-08-13 NOTE — DISCHARGE INSTRUCTIONS
.  .  .  Notes from Dr Sepulveda:  Today's colonoscopy examination went well.  I believe that I found and cauterized/burned the little blood vessel lesion that has been bleeding.  That procedure went well.  You may be a bit sore tonight.  If you are more than sore call us or come in to get checked out.  During the exam, I found 1 more \"incidental\" benign colon polyp and removed today - to be sent to the lab to be examined - a letter will be sent with results.  You may see small quantities of blood with your next 1-2 bowel movements today.    If you check your results on [facility], the \"pathology\" report on the colon polyp(s) should be released within the next 24-48 hours.  In general, a \"hyperplastic\" colon polyp is completely benign, vs an \"adenoma\" or \"sessile serrated adenoma\" which is considered a benign but precancerous colon polyp.  That will be explained in a letter/email from me as well.  No aspirin, Excedrin, Advil/Motrin/ibuprofen, Aleve/naproxen for next 10 days (to prevent bleeding.)  Internal hemorrhoids - very common  If you are raw and irritated back there after all of that diarrhea and wiping, three good options to soothe and heal the irritation include Aquaphor ointment, \"Desitin\" or \"A & D\" diaper ointment, or good old-fashioned Vaseline.  All of these soothe and create a protective barrier so that your skin can heal.  I recommend continuing close monitoring of your blood counts and iron levels.  .  .  .      Home Care Instructions for Colonoscopy  with Sedation    Diet:  - Resume your regular diet as tolerated unless otherwise instructed.  - Start with light meals to minimize bloating.  - Do not drink alcohol today.    Medication:  - If you have questions about resuming your normal medications, please contact your Primary Care Physician.    Activities:  - Take it easy today. Do not return to work today.  - Do not drive today.  - Do not operate any machinery today (including kitchen  equipment).    Colonoscopy:  - You may notice some rectal \"spotting\" (a little blood on the toilet tissue) for a day or two after the exam. This is normal.  - If you experience any rectal bleeding (not spotting), persistent tenderness or sharp severe abdominal pains, oral temperature over 100 degrees Fahrenheit, light-headedness or dizziness, or any other problems, contact your doctor.    **If unable to reach your doctor, please go to the [facility] Emergency Room**    - Your referring physician will receive a full report of your examination.  - If you do not hear from your doctor's office within two weeks of your biopsy, please call them for your results.    You may be able to see your laboratory results in [employer] between 4 and 7 business days.  In some cases, your physician may not have viewed the results before they are released to [employer].  If you have questions regarding your results contact the physician who ordered the test/exam by phone or via [employer] by choosing \"Ask a Medical Question.\"

## 2024-08-23 NOTE — TELEPHONE ENCOUNTER
Requested Prescriptions     Pending Prescriptions Disp Refills    PANTOPRAZOLE 40 MG Oral Tab EC [Pharmacy Med Name: PANTOPRAZOLE 40MG TABLETS] 180 tablet 0     Sig: TAKE 1 TABLET(40 MG) BY MOUTH TWICE DAILY        LOV       LR   5/23/2023    Last EGD 7/2/2024  Capsule 7/22/2024

## 2024-08-25 RX ORDER — PANTOPRAZOLE SODIUM 40 MG/1
40 TABLET, DELAYED RELEASE ORAL 2 TIMES DAILY
Qty: 180 TABLET | Refills: 3 | Status: SHIPPED | OUTPATIENT
Start: 2024-08-25

## 2024-09-23 ENCOUNTER — TELEPHONE (OUTPATIENT)
Facility: CLINIC | Age: 74
End: 2024-09-23

## 2024-09-23 NOTE — TELEPHONE ENCOUNTER
Rohit Sepulveda MD  [facility]  Recall for colonoscopy exam in 5 years    Colonoscopy done on 08/13/2024   Letter mailed out to patient on  9/23/2024  [facility] and Patient Outreach was placed for Colon Recall

## 2024-09-24 ENCOUNTER — APPOINTMENT (OUTPATIENT)
Dept: HEMATOLOGY/ONCOLOGY | Facility: HOSPITAL | Age: 74
End: 2024-09-24
Attending: INTERNAL MEDICINE
Payer: MEDICARE

## 2024-09-24 ENCOUNTER — APPOINTMENT (OUTPATIENT)
Dept: GENERAL RADIOLOGY | Facility: HOSPITAL | Age: 74
End: 2024-09-24
Payer: MEDICARE

## 2024-09-24 ENCOUNTER — HOSPITAL ENCOUNTER (EMERGENCY)
Facility: HOSPITAL | Age: 74
Discharge: HOME | End: 2024-09-24
Attending: EMERGENCY MEDICINE
Payer: MEDICARE

## 2024-09-24 VITALS
OXYGEN SATURATION: 97 % | DIASTOLIC BLOOD PRESSURE: 82 MMHG | HEIGHT: 68 IN | WEIGHT: 164 LBS | TEMPERATURE: 98 F | BODY MASS INDEX: 24.86 KG/M2 | SYSTOLIC BLOOD PRESSURE: 173 MMHG | RESPIRATION RATE: 18 BRPM | HEART RATE: 83 BPM

## 2024-09-24 DIAGNOSIS — S52.592A OTHER CLOSED FRACTURE OF DISTAL END OF LEFT RADIUS, INITIAL ENCOUNTER: Primary | ICD-10-CM

## 2024-09-24 PROCEDURE — 99284 EMERGENCY DEPT VISIT MOD MDM: CPT

## 2024-09-24 PROCEDURE — 73110 X-RAY EXAM OF WRIST: CPT | Performed by: EMERGENCY MEDICINE

## 2024-09-24 RX ORDER — HYDROCODONE BITARTRATE AND ACETAMINOPHEN 5; 325 MG/1; MG/1
1 TABLET ORAL EVERY 6 HOURS PRN
Qty: 10 TABLET | Refills: 0 | Status: SHIPPED | OUTPATIENT
Start: 2024-09-24

## 2024-09-24 NOTE — ED PROVIDER NOTES
Patient Seen in: [facility] Emergency Department      History     Chief Complaint   Patient presents with    Fall    Wrist Pain     Stated Complaint: fall from chair, L wrist pain    Subjective:   HPI    Patient presents emergency department complaining of left wrist pain.  She states that yesterday evening she was attempting to change the batteries on a clock.  She landed on her left wrist and has dull throbbing aching pain and swelling.  She is right-hand dominant.    Objective:   Past Medical History:    Allergic rhinitis    Flowers and evergreens    Anal lesion    Edil Velasquez; Condyloma, squamous    Arthritis    Rt wrist and hand    Breast cancer (HCC)    Breast cancer (HCC)    lumpectomy, axillary sampling, Adriamycin & Cytoxan    Cancer (HCC)    Right breast cancer surgery, chemo, radiation    Encounter for adjustment or management of vascular access device    venous access; port removal    Encounter for fitting of portacath    venous access; portacath placement    Esophageal reflux    H/O arthroscopy    RT wrist    Osteopenia of neck of femur    PONV (postoperative nausea and vomiting)    Removal of pin, plate, miriam, or screw    removal of screws    S/P T&A (status post tonsillectomy and adenoidectomy)    Status post osteotomy    ulnar osteotomy    Stress at home    mother ECF/sister/son's wedding    Vaccine for diphtheria-tetanus-pertussis, combined    as per NG:  \"DTAP vaccine\"              Past Surgical History:   Procedure Laterality Date    Adenoidectomy      Breast lumpectomy      Chemotherapy  2003    Colonoscopy  08/27/2009    Bhargav Wilson; condyloma acuminatum, rectal hyperplastic polyp    Colonoscopy  2004    Colonoscopy N/A 05/23/2023    Procedure: COLONOSCOPY;  Surgeon: Rohit Sepulveda MD;  Location: [facility] ENDOSCOPY    Colonoscopy N/A 8/13/2024    Procedure: COLONOSCOPY;  Surgeon: Rohit Sepulveda MD;  Location: [facility] ENDOSCOPY    Laryngoscopy,dirct,op scope,fb remv   2016    Lumpectomy right Right       30 yrs old  31 yrs old    80 daughter,, 81 son    Other surgical history  1954    Removal of birthmark on right upper leg.     -ulna osteo    Radiation right Right     Skin surgery      Tonsillectomy                  Social History     Socioeconomic History    Marital status:    Tobacco Use    Smoking status: Former     Current packs/day: 0.00     Average packs/day: 0.5 packs/day for 25.0 years (12.5 ttl pk-yrs)     Types: Cigarettes     Start date: 1982     Quit date: 2007     Years since quittin.4    Smokeless tobacco: Former     Quit date: 2008   Vaping Use    Vaping status: Never Used   Substance and Sexual Activity    Alcohol use: Yes     Alcohol/week: 7.0 standard drinks of alcohol     Types: 7 Glasses of wine per week     Comment: Enjoy a glass of wine with dinner    Drug use: No   Other Topics Concern    Caffeine Concern Yes     Comment: 4 cups coffee daily    Left Handed No    Right Handed Yes    Currently spends a great deal of time in the sun No    History of tanning No    Hx of Spending Great Deal of Time in Sun No    Bad sunburns in the past No    Tanning Salons in the Past No    Hx of Radiation Treatments Yes              Review of Systems    Positive for stated Chief Complaint: Fall and Wrist Pain    Other systems are as noted in HPI.  Constitutional and vital signs reviewed.      All other systems reviewed and negative except as noted above.    Physical Exam     ED Triage Vitals [24 1220]   BP (!) 173/82   Pulse 83   Resp 18   Temp 97.6 °F (36.4 °C)   Temp src Oral   SpO2 97 %   O2 Device        Current Vitals:   Vital Signs  BP: (!) 173/82  Pulse: 83  Resp: 18  Temp: 97.6 °F (36.4 °C)  Temp src: Oral    Oxygen Therapy  SpO2: 97 %            Physical Exam  Vitals and nursing note reviewed.   Constitutional:       General: She is not in acute distress.     Appearance: She is well-developed.    Pulmonary:      Effort: No respiratory distress.   Musculoskeletal:      Comments: The left upper extremity was examined.  There are strong pulses in the wrist with intrinsic muscles of the hand intact and capillary brisk less than 2 seconds.  There is focal swelling and tenderness over the distal radius with mild ecchymosis.  The elbow and shoulder are unaffected and nontender on examination.               ED Course   Labs Reviewed - No data to display                   MDM                       Medical Decision Making  Differential diagnosis considered for sprain, fracture, dislocation, contusion.    Problems Addressed:  Other closed fracture of distal end of left radius, initial encounter: acute illness or injury    Amount and/or Complexity of Data Reviewed  Radiology: ordered and independent interpretation performed. Decision-making details documented in ED Course.     Details: X-ray shows distal radius impacted fracture.  Discussion of management or test interpretation with external provider(s): Sugar-tong OCL placed by technician, CMS intact after placement..  Sling applied.  Follow-up with orthopedics.  [place] as needed for pain.    Risk  Prescription drug management.        Disposition and Plan     Clinical Impression:  1. Other closed fracture of distal end of left radius, initial encounter         Disposition:  Discharge  9/24/2024  1:29 pm    Follow-up:  Behery, Omar Atef, MD  [address] [address]  [phone]    Schedule an appointment as soon as possible for a visit      We recommend that you schedule follow up care with a primary care provider within the next three months to obtain basic health screening including reassessment of your blood pressure.      Medications Prescribed:  Current Discharge Medication List        START taking these medications    Details   HYDROcodone-acetaminophen 5-325 MG Oral Tab Take 1 tablet by mouth every 6 (six) hours as  needed.  Qty: 10 tablet, Refills: 0    Associated Diagnoses: Other closed fracture of distal end of left radius, initial encounter

## 2024-10-10 ENCOUNTER — NURSE ONLY (OUTPATIENT)
Dept: HEMATOLOGY/ONCOLOGY | Facility: HOSPITAL | Age: 74
End: 2024-10-10
Attending: INTERNAL MEDICINE
Payer: MEDICARE

## 2024-10-10 VITALS
OXYGEN SATURATION: 100 % | HEART RATE: 69 BPM | DIASTOLIC BLOOD PRESSURE: 77 MMHG | HEIGHT: 68 IN | RESPIRATION RATE: 16 BRPM | WEIGHT: 163.19 LBS | SYSTOLIC BLOOD PRESSURE: 160 MMHG | BODY MASS INDEX: 24.73 KG/M2 | TEMPERATURE: 97 F

## 2024-10-10 DIAGNOSIS — Z85.3 ENCOUNTER FOR FOLLOW-UP SURVEILLANCE OF BREAST CANCER: ICD-10-CM

## 2024-10-10 DIAGNOSIS — Z12.31 SCREENING MAMMOGRAM, ENCOUNTER FOR: ICD-10-CM

## 2024-10-10 DIAGNOSIS — Z08 ENCOUNTER FOR FOLLOW-UP SURVEILLANCE OF BREAST CANCER: ICD-10-CM

## 2024-10-10 DIAGNOSIS — D50.9 IRON DEFICIENCY ANEMIA, UNSPECIFIED IRON DEFICIENCY ANEMIA TYPE: ICD-10-CM

## 2024-10-10 DIAGNOSIS — Z85.3 HISTORY OF RIGHT BREAST CANCER: ICD-10-CM

## 2024-10-10 DIAGNOSIS — D50.0 IRON DEFICIENCY ANEMIA DUE TO CHRONIC BLOOD LOSS: Primary | ICD-10-CM

## 2024-10-10 DIAGNOSIS — K90.9 IRON MALABSORPTION (HCC): ICD-10-CM

## 2024-10-10 LAB
BASOPHILS # BLD AUTO: 0.03 X10(3) UL (ref 0–0.2)
BASOPHILS NFR BLD AUTO: 0.5 %
DEPRECATED HBV CORE AB SER IA-ACNC: 127 NG/ML
DEPRECATED RDW RBC AUTO: 49 FL (ref 35.1–46.3)
EOSINOPHIL # BLD AUTO: 0.1 X10(3) UL (ref 0–0.7)
EOSINOPHIL NFR BLD AUTO: 1.7 %
ERYTHROCYTE [DISTWIDTH] IN BLOOD BY AUTOMATED COUNT: 14.6 % (ref 11–15)
HCT VFR BLD AUTO: 39.6 %
HGB BLD-MCNC: 14.3 G/DL
IMM GRANULOCYTES # BLD AUTO: 0.02 X10(3) UL (ref 0–1)
IMM GRANULOCYTES NFR BLD: 0.3 %
IRON SATN MFR SERPL: 33 %
IRON SERPL-MCNC: 92 UG/DL
LYMPHOCYTES # BLD AUTO: 1.35 X10(3) UL (ref 1–4)
LYMPHOCYTES NFR BLD AUTO: 22.5 %
MCH RBC QN AUTO: 32.8 PG (ref 26–34)
MCHC RBC AUTO-ENTMCNC: 36.1 G/DL (ref 31–37)
MCV RBC AUTO: 90.8 FL
MONOCYTES # BLD AUTO: 0.67 X10(3) UL (ref 0.1–1)
MONOCYTES NFR BLD AUTO: 11.2 %
NEUTROPHILS # BLD AUTO: 3.83 X10 (3) UL (ref 1.5–7.7)
NEUTROPHILS # BLD AUTO: 3.83 X10(3) UL (ref 1.5–7.7)
NEUTROPHILS NFR BLD AUTO: 63.8 %
PLATELET # BLD AUTO: 319 10(3)UL (ref 150–450)
RBC # BLD AUTO: 4.36 X10(6)UL
TIBC SERPL-MCNC: 277 UG/DL (ref 250–425)
TRANSFERRIN SERPL-MCNC: 186 MG/DL (ref 250–380)
WBC # BLD AUTO: 6 X10(3) UL (ref 4–11)

## 2024-10-10 PROCEDURE — 84466 ASSAY OF TRANSFERRIN: CPT

## 2024-10-10 PROCEDURE — 85025 COMPLETE CBC W/AUTO DIFF WBC: CPT

## 2024-10-10 PROCEDURE — 82728 ASSAY OF FERRITIN: CPT

## 2024-10-10 PROCEDURE — 36415 COLL VENOUS BLD VENIPUNCTURE: CPT

## 2024-10-10 PROCEDURE — 99213 OFFICE O/P EST LOW 20 MIN: CPT | Performed by: INTERNAL MEDICINE

## 2024-10-10 PROCEDURE — 83540 ASSAY OF IRON: CPT

## 2024-10-10 NOTE — PROGRESS NOTES
HPI    Lashae Figueroa is a 74 year old female here for follow up of   Encounter Diagnoses   Name Primary?    Iron deficiency anemia due to chronic blood loss Yes    Iron malabsorption (HCC)     History of right breast cancer     Encounter for follow-up surveillance of breast cancer     Screening mammogram, encounter for      Patient received 600 mg of Venofer in divided doses of 200 mg in March 2024 and second course in July 2024..     Patient had EGD on 7/2/2024 and was found to have 3 to 4 cm hiatal hernia with severe acute on chronic reflux esophagitis with changes just above the distal esophagus.  There was 1 wide mucosal erosion and 1 deep oval-shaped 6 to 7 mm esophageal ulceration which was biopsied.  She also colonoscopy at a time and was found to have 5 to 6 mm compound colonic AVM lesion in the cecum not far from the appendiceal orifice.  This was treated with APC cautery ablation.  She would recommend to start on PPI therapy indefinitely.  She was recommended for colonoscopy in 10 years.  Patient then had capsule endoscopy on 7/22/2024, which showed mild mucosal edema, gastropathy changes in the stomach but no erosions or bleeding.  There was 1 single suspected nonbleeding AVM measuring 1 to 2 mm in small bowel seen at the 1 hour kev.  The capsule reached the cecum at 3 hours and 37 minutes.  At 4 hours and 54 minutes there was a sudden pool of bright red blood in the colon.  Limited views of the colonic wall show either mucous or possible superficial colonic ulceration associated with blood.  Repeated pictures of blood persisted from there through 6 hours and 37 minutes suspecting colonic ulcer or AVM lesion.  Patient was then recommended to have repeat colonoscopy.  Repeat colonoscopy on 8/13/2024, did not show any active bleeding.  There was a subtle 4 mm AVM versus suction artifact at the ileocecal valve ablated with APC cautery there was a second 6 mm faint AVM lesion in the cecum across the  ileocecal valve around the site where the video capsule had encountered the pool of blood which will also underwent APC cautery.  The APC cautery provoked immediate bleeding from the lesion with the first few doses of cautery it did stop bleeding with subsequent treatments.  Patient also was found to have an incidental 6 mm polyp removed in the mid ascending colon by cold snare.  Pathology revealed this was a tubular adenoma.  Patient was recommended for repeat colonoscopy in 5 years.  She was recommended for no aspirin or NSAIDs for 5 days after procedure.    Patient recently was at ED on 9/24/2024 after mechanical fall with a closed fracture of the distal end of the left radius.    States has energy does too much and then tires out.  Not sleeping well.      Taking pantoprazole as prescribed.    ECOG PS 0.      Review of Systems:     Review of Systems   Constitutional:  Positive for fatigue (as above).   Respiratory:  Negative for shortness of breath.    Cardiovascular:  Negative for chest pain and palpitations.   Gastrointestinal:  Negative for abdominal pain and blood in stool.   Genitourinary:  Positive for nocturia.    Musculoskeletal:  Positive for arthralgias (wrists).        As above   Neurological:  Negative for dizziness and headaches.   Hematological:  Does not bruise/bleed easily.   Psychiatric/Behavioral:  Positive for sleep disturbance.          Current Outpatient Medications   Medication Sig Dispense Refill    pantoprazole 40 MG Oral Tab EC Take 1 tablet (40 mg total) by mouth 2 (two) times daily. 180 tablet 3    Multiple Vitamins-Minerals (PRESERVISION AREDS 2 OR) Take by mouth 2 (two) times a day.      latanoprost 0.005 % Ophthalmic Solution Place 1 drop into both eyes nightly. TAKE AT BEDTIME      SIMBRINZA 1-0.2 % Ophthalmic Suspension Place 1 drop into both eyes in the morning and 1 drop before bedtime.      Multiple Vitamins-Minerals (CENTRUM SILVER ADULT 50+ OR) Take by mouth daily.        Allergies:   Allergies   Allergen Reactions    Ambien [Zolpidem] INSOMNIA    Antihistamine [Clarithromycin] INSOMNIA    Dalmane [Flurazepam] OTHER (SEE COMMENTS)     Severe headaches    Decongestant [Oxymetazoline] JITTERY    Duricef [Cefadroxil] ITCHING     dermatitis    Penicillins HIVES and RASH    Rofecoxib ANXIETY    Codeine ANAPHYLAXIS    Demerol [Meperidine] OTHER (SEE COMMENTS)     Therapeutic failure    Ibuprofen OTHER (SEE COMMENTS)    Rash Away  [Sensi-Care Protective Barrier] ANAPHYLAXIS    Vioxx [Rofecoxib] OTHER (SEE COMMENTS)       Past Medical History:    Allergic rhinitis    Flowers and evergreens    Anal lesion    Ron, Edil; Condyloma, squamous    Arthritis    Rt wrist and hand    Breast cancer (HCC)    Breast cancer (HCC)    lumpectomy, axillary sampling, Adriamycin & Cytoxan    Cancer (HCC)    Right breast cancer surgery, chemo, radiation    Encounter for adjustment or management of vascular access device    venous access; port removal    Encounter for fitting of portacath    venous access; portacath placement    Esophageal reflux    H/O arthroscopy    RT wrist    Osteopenia of neck of femur    PONV (postoperative nausea and vomiting)    Removal of pin, plate, miriam, or screw    removal of screws    S/P T&A (status post tonsillectomy and adenoidectomy)    Status post osteotomy    ulnar osteotomy    Stress at home    mother ECF/sister/son's wedding    Vaccine for diphtheria-tetanus-pertussis, combined    as per NG:  \"DTAP vaccine\"     Past Surgical History:   Procedure Laterality Date    Adenoidectomy      Breast lumpectomy      Chemotherapy  2003    Colonoscopy  08/27/2009    Bhargav Wilson; condyloma acuminatum, rectal hyperplastic polyp    Colonoscopy  2004    Colonoscopy N/A 05/23/2023    Procedure: COLONOSCOPY;  Surgeon: Rohit Sepulveda MD;  Location: [facility] ENDOSCOPY    Colonoscopy N/A 8/13/2024    Procedure: COLONOSCOPY;  Surgeon: Rohit Sepulveda MD;  Location: [facility]  ENDOSCOPY    Laryngoscopy,dirct,op scope,fb remv  2016    Lumpectomy right Right       30 yrs old  31 yrs old    80 daughter,, 81 son    Other surgical history  1954    Removal of birthmark on right upper leg.     1992-ulna osteo    Radiation right Right     Skin surgery      Tonsillectomy       Social History     Socioeconomic History    Marital status:    Tobacco Use    Smoking status: Former     Current packs/day: 0.00     Average packs/day: 0.5 packs/day for 25.0 years (12.5 ttl pk-yrs)     Types: Cigarettes     Start date: 1982     Quit date: 2007     Years since quittin.4    Smokeless tobacco: Former     Quit date: 2008   Vaping Use    Vaping status: Never Used   Substance and Sexual Activity    Alcohol use: Yes     Alcohol/week: 7.0 standard drinks of alcohol     Types: 7 Glasses of wine per week     Comment: Enjoy a glass of wine with dinner    Drug use: No   Other Topics Concern    Caffeine Concern Yes     Comment: 4 cups coffee daily    Left Handed No    Right Handed Yes    Currently spends a great deal of time in the sun No    History of tanning No    Hx of Spending Great Deal of Time in Sun No    Bad sunburns in the past No    Tanning Salons in the Past No    Hx of Radiation Treatments Yes         Family History   Problem Relation Age of Onset    Cancer Father         lung    Dementia Mother         7 yrs Nursing home    Cancer Sister         bn brain tumor    Colon Cancer Brother     Prostate Cancer Brother         lives in [place]    Cancer Brother         Liver    Cancer Brother         Prostate    Diabetes Brother     Hypertension Brother     Breast Cancer Self 53            Ovarian Cancer Neg          PHYSICAL EXAM:    /77 (BP Location: Left arm, Patient Position: Sitting, Cuff Size: adult)   Pulse 69   Temp 97.4 °F (36.3 °C) (Oral)   Resp 16   Ht 1.727 m (5' 8\")   Wt 74 kg (163 lb 3.2 oz)   SpO2 100%   BMI 24.81 kg/m²    Wt Readings from Last 6 Encounters:   10/10/24 74 kg (163 lb 3.2 oz)   09/24/24 74.4 kg (164 lb)   08/09/24 73 kg (161 lb)   07/12/24 73.9 kg (163 lb)   06/25/24 75.3 kg (166 lb)   06/10/24 75.3 kg (166 lb)     Physical Exam  General: Patient is alert, not in acute distress.  HEENT: EOMs intact. PERRL.   Neck: No JVD. No palpable lymphadenopathy. Neck is supple.  Chest: Clear to auscultation.  Breasts:  Deferred.  Heart: Regular rate and rhythm.   Abdomen: Soft, non tender with good bowel sounds.  Extremities: No edema.  Neurological: Grossly intact.   Lymphatics: There is no palpable lymphadenopathy throughout in the cervical, supraclavicular, axillary, or inguinal regions.  Psych/Depression: nl        ASSESSMENT/PLAN:     1. Iron deficiency anemia due to chronic blood loss    2. Iron malabsorption (HCC)    3. History of right breast cancer    4. Encounter for follow-up surveillance of breast cancer    5. Screening mammogram, encounter for      Iron deficiency anemia:     Patient received 200 mg of Venofer x3 doses.  Last completed March 2024.  Patient has been on oral maintenance iron therapy, which she is taking daily.      This most recent course of iron infusions was due to worsening hemoglobin with low ferritin.  Today's iron studies, show that the patient still has iron deficiency, which is slightly worse than previously seen in the month of August 2023.      Received feraheme 510 mg x 2 on 7/10/2024 and 7/17/2024.      Completed GI workup as detailed above.  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    Discussed with the patient that between the treatment for stopping the blood loss as detailed above, as well as the 2 doses of Venofer given in July, she is no longer anemic.  She also does not have iron deficiency.    We will have 6 months follow-up with CBC and iron studies for follow-up of stability of her counts, given her most recent episodes of GI bleeding.  If the  patient does not have any further bleeding, and remains without anemia or iron deficiency, follow-up as needed.    For history of breast cancer, screening mammogram on 7/15/2024 BI-RADS 2, she will have follow-up for her breast imaging and for history of breast cancer surveillance in 12 months time.    No orders of the defined types were placed in this encounter.    MDM low risk       Results From Past 48 Hours:  Recent Results (from the past 48 hours)   CBC With Differential With Platelet    Collection Time: 10/10/24 11:10 AM   Result Value Ref Range    WBC 6.0 4.0 - 11.0 x10(3) uL    RBC 4.36 3.80 - 5.30 x10(6)uL    HGB 14.3 12.0 - 16.0 g/dL    HCT 39.6 35.0 - 48.0 %    MCV 90.8 80.0 - 100.0 fL    MCH 32.8 26.0 - 34.0 pg    MCHC 36.1 31.0 - 37.0 g/dL    RDW-SD 49.0 (H) 35.1 - 46.3 fL    RDW 14.6 11.0 - 15.0 %    .0 150.0 - 450.0 10(3)uL    Neutrophil Absolute Prelim 3.83 1.50 - 7.70 x10 (3) uL    Neutrophil Absolute 3.83 1.50 - 7.70 x10(3) uL    Lymphocyte Absolute 1.35 1.00 - 4.00 x10(3) uL    Monocyte Absolute 0.67 0.10 - 1.00 x10(3) uL    Eosinophil Absolute 0.10 0.00 - 0.70 x10(3) uL    Basophil Absolute 0.03 0.00 - 0.20 x10(3) uL    Immature Granulocyte Absolute 0.02 0.00 - 1.00 x10(3) uL    Neutrophil % 63.8 %    Lymphocyte % 22.5 %    Monocyte % 11.2 %    Eosinophil % 1.7 %    Basophil % 0.5 %    Immature Granulocyte % 0.3 %   Ferritin    Collection Time: 10/10/24 11:10 AM   Result Value Ref Range    Ferritin 127 50 - 306 ng/mL   Iron And Tibc    Collection Time: 10/10/24 11:10 AM   Result Value Ref Range    Iron 92 50 - 170 ug/dL    Transferrin 186 (L) 250 - 380 mg/dL    Total Iron Binding Capacity 277 250 - 425 ug/dL    % Saturation 33 15 - 50 %       Imaging & Referrals:  None   No orders of the defined types were placed in this encounter.    Component      Latest Ref Rng 6/10/2024 10/10/2024   WBC      4.0 - 11.0 x10(3) uL 6.6  6.0    RBC      3.80 - 5.30 x10(6)uL 3.66 (L)  4.36    Hemoglobin       12.0 - 16.0 g/dL 11.3 (L)  14.3    Hematocrit      35.0 - 48.0 % 34.2 (L)  39.6    MCV      80.0 - 100.0 fL 93.4  90.8    MCH      26.0 - 34.0 pg 30.9  32.8    MCHC      31.0 - 37.0 g/dL 33.0  36.1    RDW-SD      35.1 - 46.3 fL 52.8 (H)  49.0 (H)    RDW      11.0 - 15.0 % 15.2 (H)  14.6    Platelet Count      150.0 - 450.0 10(3)uL 309.0  319.0    Prelim Neutrophil Abs      1.50 - 7.70 x10 (3) uL 4.30  3.83    Neutrophils Absolute      1.50 - 7.70 x10(3) uL 4.30  3.83    Lymphocytes Absolute      1.00 - 4.00 x10(3) uL 1.45  1.35    Monocytes Absolute      0.10 - 1.00 x10(3) uL 0.65  0.67    Eosinophils Absolute      0.00 - 0.70 x10(3) uL 0.10  0.10    Basophils Absolute      0.00 - 0.20 x10(3) uL 0.03  0.03    Immature Granulocyte Absolute      0.00 - 1.00 x10(3) uL 0.02  0.02    Neutrophils %      % 65.7  63.8    Lymphocytes %      % 22.1  22.5    Monocytes %      % 9.9  11.2    Eosinophils %      % 1.5  1.7    Basophils %      % 0.5  0.5    Immature Granulocyte %      % 0.3  0.3    Iron, Serum      50 - 170 ug/dL 33 (L)  92    Transferrin      250 - 380 mg/dL 222 (L)  186 (L)    Iron Bind.Cap.(TIBC)      250 - 425 ug/dL 331  277    Iron Saturation      15 - 50 % 10 (L)  33    FERRITIN      50 - 306 ng/mL 13.3 (L)  127         PROCEDURE: [facility] GLENIS 2D+3D SCREENING BILAT ([id number])     COMPARISON: [facility], [facility] GLENIS 2D+3D SCREENING BILAT ([id number]), 7/07/2022, 10:54 AM.  [facility], [facility] GLENIS 2D+3D SCREENING BILAT ([id number]), 6/28/2021, 11:22 AM.  [facility] GLENIS 2D+3D SCREENING BILAT ([id number]), 6/24/2020, 11:21 AM.  [facility] GLENIS 2D+3D SCREENING BILAT ([id number]), 6/21/2019, 2:26 PM.  [facility] GLENIS 2D+3D SCREENING BILAT  ([id number]), 6/19/2018, 3:36 PM.  [facility], [facility] DIAGNOSTIC BILATERAL CPT=77066), 5/22/2017, 11:27 AM.  [place]  [facility], [facility] DGTL DIAG W CAD ENRIQUE PF, 6/09/2016, 10:00 AM.  [facility], [facility] PF DGTL DIAG W CAD ENRIQUE, 6/09/2015, 1:01 PM.  [facility] GLENIS 2D+3D SCREENING BILAT ([id number]), 7/10/2023, 11:53 AM.     INDICATIONS: Z12.31 Screening mammogram, encounter for 73-year-old patient with history of right breast malignancy diagnosed at age 53 status post treatment for screening mammogram     TECHNIQUE: Full field direct screening mammography was performed and images were reviewed with the [employer] LISA 1.5.1.5 CAD device.  3D tomosynthesis was performed and reviewed        BREAST COMPOSITION:   Category b-Scattered areas fibroglandular density.        FINDINGS: Stable postsurgical changes are seen in the right breast.  The parenchyma pattern is stable with no new suspicious asymmetry, mass, architectural distortion, or microcalcifications identified in either breast.        CONCLUSION:   Benign findings.  No mammographic evidence for breast malignancy.  As long as the patient's clinical breast exam remains unchanged, annual screening mammogram is recommended.     BI-RADS CATEGORY:    DIAGNOSTIC CATEGORY 2--BENIGN FINDING:       RECOMMENDATIONS:  ROUTINE MAMMOGRAM AND CLINICAL EVALUATION IN 12 MONTHS.                   PLEASE NOTE: NORMAL MAMMOGRAM DOES NOT EXCLUDE THE POSSIBILITY OF BREAST CANCER.  A CLINICALLY SUSPICIOUS PALPABLE LUMP SHOULD BE BIOPSIED.       For patients over the age of 40, the target due date for the patient's next mammogram has been entered into a reminder system.       Patient received a discharge summary from the technologist after completion of exam.     Breast marker legend used on images    Triangle = Palpable lump  [place] = Skin tag or mole  BB = Nipple  Linear kev = Scar  Square = Pain        Finalized by (CST): Taniya Gutierrez MD on 7/16/2024 at 3:18 PM

## 2025-04-15 ENCOUNTER — NURSE ONLY (OUTPATIENT)
Age: 75
End: 2025-04-15
Attending: INTERNAL MEDICINE
Payer: MEDICARE

## 2025-04-15 ENCOUNTER — OFFICE VISIT (OUTPATIENT)
Age: 75
End: 2025-04-15
Attending: INTERNAL MEDICINE
Payer: MEDICARE

## 2025-04-15 VITALS
RESPIRATION RATE: 18 BRPM | TEMPERATURE: 98 F | DIASTOLIC BLOOD PRESSURE: 90 MMHG | WEIGHT: 168.63 LBS | SYSTOLIC BLOOD PRESSURE: 151 MMHG | BODY MASS INDEX: 26.78 KG/M2 | HEIGHT: 66.54 IN | HEART RATE: 61 BPM | OXYGEN SATURATION: 98 %

## 2025-04-15 DIAGNOSIS — Z85.3 HISTORY OF RIGHT BREAST CANCER: ICD-10-CM

## 2025-04-15 DIAGNOSIS — M85.80 OSTEOPENIA AFTER MENOPAUSE: ICD-10-CM

## 2025-04-15 DIAGNOSIS — Z85.3 ENCOUNTER FOR FOLLOW-UP SURVEILLANCE OF BREAST CANCER: ICD-10-CM

## 2025-04-15 DIAGNOSIS — Z12.31 SCREENING MAMMOGRAM, ENCOUNTER FOR: ICD-10-CM

## 2025-04-15 DIAGNOSIS — D50.9 IRON DEFICIENCY ANEMIA, UNSPECIFIED IRON DEFICIENCY ANEMIA TYPE: ICD-10-CM

## 2025-04-15 DIAGNOSIS — D50.8 OTHER IRON DEFICIENCY ANEMIA: ICD-10-CM

## 2025-04-15 DIAGNOSIS — Z12.39 BREAST CANCER SCREENING OTHER THAN MAMMOGRAM: ICD-10-CM

## 2025-04-15 DIAGNOSIS — Z08 ENCOUNTER FOR FOLLOW-UP SURVEILLANCE OF BREAST CANCER: ICD-10-CM

## 2025-04-15 DIAGNOSIS — Z78.0 OSTEOPENIA AFTER MENOPAUSE: ICD-10-CM

## 2025-04-15 DIAGNOSIS — D50.0 IRON DEFICIENCY ANEMIA DUE TO CHRONIC BLOOD LOSS: Primary | ICD-10-CM

## 2025-04-15 LAB
BASOPHILS # BLD AUTO: 0.04 X10(3) UL (ref 0–0.2)
BASOPHILS NFR BLD AUTO: 0.6 %
DEPRECATED HBV CORE AB SER IA-ACNC: 90 NG/ML (ref 50–306)
DEPRECATED RDW RBC AUTO: 44.6 FL (ref 35.1–46.3)
EOSINOPHIL # BLD AUTO: 0.18 X10(3) UL (ref 0–0.7)
EOSINOPHIL NFR BLD AUTO: 2.9 %
ERYTHROCYTE [DISTWIDTH] IN BLOOD BY AUTOMATED COUNT: 12.6 % (ref 11–15)
HCT VFR BLD AUTO: 41.4 % (ref 35–48)
HGB BLD-MCNC: 14.4 G/DL (ref 12–16)
IMM GRANULOCYTES # BLD AUTO: 0.02 X10(3) UL (ref 0–1)
IMM GRANULOCYTES NFR BLD: 0.3 %
IRON SATN MFR SERPL: 39 % (ref 15–50)
IRON SERPL-MCNC: 104 UG/DL (ref 50–170)
LYMPHOCYTES # BLD AUTO: 1.76 X10(3) UL (ref 1–4)
LYMPHOCYTES NFR BLD AUTO: 28.3 %
MCH RBC QN AUTO: 33.1 PG (ref 26–34)
MCHC RBC AUTO-ENTMCNC: 34.8 G/DL (ref 31–37)
MCV RBC AUTO: 95.2 FL (ref 80–100)
MONOCYTES # BLD AUTO: 0.7 X10(3) UL (ref 0.1–1)
MONOCYTES NFR BLD AUTO: 11.3 %
NEUTROPHILS # BLD AUTO: 3.51 X10 (3) UL (ref 1.5–7.7)
NEUTROPHILS # BLD AUTO: 3.51 X10(3) UL (ref 1.5–7.7)
NEUTROPHILS NFR BLD AUTO: 56.6 %
PLATELET # BLD AUTO: 260 10(3)UL (ref 150–450)
RBC # BLD AUTO: 4.35 X10(6)UL (ref 3.8–5.3)
TOTAL IRON BINDING CAPACITY: 264 UG/DL (ref 250–425)
TRANSFERRIN SERPL-MCNC: 191 MG/DL (ref 250–380)
WBC # BLD AUTO: 6.2 X10(3) UL (ref 4–11)

## 2025-04-15 RX ORDER — DORZOLAMIDE HYDROCHLORIDE AND TIMOLOL MALEATE PRESERVATIVE FREE 20; 5 MG/ML; MG/ML
SOLUTION/ DROPS OPHTHALMIC
COMMUNITY
Start: 2025-01-25

## 2025-04-15 NOTE — PROGRESS NOTES
HPI    Lashae Figueroa is a 74 year old female here for follow up of   Encounter Diagnoses   Name Primary?    Iron deficiency anemia due to chronic blood loss Yes    Other iron deficiency anemia     Screening mammogram, encounter for     History of right breast cancer     Encounter for follow-up surveillance of breast cancer     Breast cancer screening other than mammogram      Patient received 600 mg of Venofer in divided doses of 200 mg in March 2024 and second course in July 2024..     Taking pantoprazole as prescribed.    States has energy and keeps busy.    Had dental work and treatment for glaucoma too.      Denies changes on SBE.      ECOG PS 0.      Review of Systems:     Review of Systems   Constitutional:  Negative for fatigue.   Respiratory:  Negative for shortness of breath.    Cardiovascular:  Negative for chest pain and palpitations.   Gastrointestinal:  Negative for abdominal pain and blood in stool.   Genitourinary:  Positive for nocturia.    Musculoskeletal:  Positive for arthralgias (wrists). Negative for back pain and neck pain.   Neurological:  Positive for headaches (after eye procedure yesterday.). Negative for dizziness.   Hematological:  Bruises/bleeds easily.   Psychiatric/Behavioral:  Positive for sleep disturbance.          Current Outpatient Medications   Medication Sig Dispense Refill    Dorzolamide HCl-Timolol Mal PF 2-0.5 % Ophthalmic Solution       pantoprazole 40 MG Oral Tab EC Take 1 tablet (40 mg total) by mouth 2 (two) times daily. 180 tablet 3    Multiple Vitamins-Minerals (PRESERVISION AREDS 2 OR) Take by mouth in the morning and before bedtime.      latanoprost 0.005 % Ophthalmic Solution Place 1 drop into both eyes nightly. TAKE AT BEDTIME      Multiple Vitamins-Minerals (CENTRUM SILVER ADULT 50+ OR) Take by mouth in the morning.      SIMBRINZA 1-0.2 % Ophthalmic Suspension Place 1 drop into both eyes in the morning and 1 drop before bedtime.       Allergies:   Allergies    Allergen Reactions    Ambien [Zolpidem] INSOMNIA    Antihistamine [Clarithromycin] INSOMNIA    Dalmane [Flurazepam] OTHER (SEE COMMENTS)     Severe headaches    Decongestant [Oxymetazoline] JITTERY    Duricef [Cefadroxil] ITCHING     dermatitis    Penicillins HIVES and RASH    Rofecoxib ANXIETY    Codeine ANAPHYLAXIS    Demerol [Meperidine] OTHER (SEE COMMENTS)     Therapeutic failure    Ibuprofen OTHER (SEE COMMENTS)    Rash Away  [Sensi-Care Protective Barrier] ANAPHYLAXIS    Vioxx [Rofecoxib] OTHER (SEE COMMENTS)       Past Medical History:    Allergic rhinitis    Flowers and evergreens    Anal lesion    Edil Velasquez; Condyloma, squamous    Arthritis    Rt wrist and hand    Breast cancer (HCC)    Breast cancer (HCC)    lumpectomy, axillary sampling, Adriamycin & Cytoxan    Cancer (HCC)    Right breast cancer surgery, chemo, radiation    Encounter for adjustment or management of vascular access device    venous access; port removal    Encounter for fitting of portacath    venous access; portacath placement    Esophageal reflux    H/O arthroscopy    RT wrist    Osteopenia of neck of femur    PONV (postoperative nausea and vomiting)    Removal of pin, plate, miriam, or screw    removal of screws    S/P T&A (status post tonsillectomy and adenoidectomy)    Status post osteotomy    ulnar osteotomy    Stress at home    mother ECF/sister/son's wedding    Vaccine for diphtheria-tetanus-pertussis, combined    as per NG:  \"DTAP vaccine\"     Past Surgical History:   Procedure Laterality Date    Adenoidectomy      Breast lumpectomy      Chemotherapy  2003    Colonoscopy  08/27/2009    Bhargav Wilson; condyloma acuminatum, rectal hyperplastic polyp    Colonoscopy  2004    Colonoscopy N/A 05/23/2023    Procedure: COLONOSCOPY;  Surgeon: Rohit Sepulveda MD;  Location: [facility] ENDOSCOPY    Colonoscopy N/A 8/13/2024    Procedure: COLONOSCOPY;  Surgeon: Rohit Sepulveda MD;  Location: [facility] ENDOSCOPY     Laryngoscopy,dirct,op scope,fb remv  2016    Lumpectomy right Right       30 yrs old  31 yrs old    80 daughter,, 81 son    Other surgical history  1954    Removal of birthmark on right upper leg.     1992-ulna osteo    Radiation right Right     Skin surgery      Tonsillectomy       Social History     Socioeconomic History    Marital status:    Tobacco Use    Smoking status: Former     Current packs/day: 0.00     Average packs/day: 0.5 packs/day for 25.0 years (12.5 ttl pk-yrs)     Types: Cigarettes     Start date: 1982     Quit date: 2007     Years since quittin.9    Smokeless tobacco: Former     Quit date: 2008   Vaping Use    Vaping status: Never Used   Substance and Sexual Activity    Alcohol use: Yes     Alcohol/week: 7.0 standard drinks of alcohol     Types: 7 Glasses of wine per week     Comment: Enjoy a glass of wine with dinner    Drug use: No   Other Topics Concern    Caffeine Concern Yes     Comment: 4 cups coffee daily    Left Handed No    Right Handed Yes    Currently spends a great deal of time in the sun No    History of tanning No    Hx of Spending Great Deal of Time in Sun No    Bad sunburns in the past No    Tanning Salons in the Past No    Hx of Radiation Treatments Yes         Family History   Problem Relation Age of Onset    Cancer Father         lung    Dementia Mother         7 yrs Nursing home    Cancer Sister         bn brain tumor    Colon Cancer Brother     Prostate Cancer Brother         lives in [place]    Cancer Brother         Liver    Cancer Brother         Prostate    Diabetes Brother     Hypertension Brother     Breast Cancer Self 53            Ovarian Cancer Neg          PHYSICAL EXAM:    /90 (BP Location: Left arm, Patient Position: Sitting, Cuff Size: adult)   Pulse 61   Temp 97.5 °F (36.4 °C) (Oral)   Resp 18   Ht 1.69 m (5' 6.54\")   Wt 76.5 kg (168 lb 9.6 oz)   SpO2 98%   BMI 26.78 kg/m²   Wt  Readings from Last 6 Encounters:   04/15/25 76.5 kg (168 lb 9.6 oz)   10/10/24 74 kg (163 lb 3.2 oz)   09/24/24 74.4 kg (164 lb)   08/09/24 73 kg (161 lb)   07/12/24 73.9 kg (163 lb)   06/25/24 75.3 kg (166 lb)     Physical Exam  General: Patient is alert, not in acute distress.  HEENT: EOMs intact. PERRL.   Neck: No JVD. No palpable lymphadenopathy. Neck is supple.  Chest: Clear to auscultation.  Breasts: L breast no masses,  R breast post surgical changes, no masses.   Heart: Regular rate and rhythm.   Abdomen: Soft, non tender with good bowel sounds.  Extremities: No edema.  Neurological: Grossly intact.   Lymphatics: There is no palpable lymphadenopathy throughout in the cervical, supraclavicular, axillary, or inguinal regions.  Psych/Depression: nl        ASSESSMENT/PLAN:     1. Iron deficiency anemia due to chronic blood loss    2. Other iron deficiency anemia    3. Screening mammogram, encounter for    4. History of right breast cancer    5. Encounter for follow-up surveillance of breast cancer    6. Breast cancer screening other than mammogram      Iron deficiency anemia:     Patient received 200 mg of Venofer x3 doses.  Last completed March 2024.  Patient has been on oral maintenance iron therapy, which she is taking daily.      This most recent course of iron infusions was due to worsening hemoglobin with low ferritin.  Today's iron studies, show that the patient still has iron deficiency, which is slightly worse than previously seen in the month of August 2023.      Received feraheme 510 mg x 2 on 7/10/2024 and 7/17/2024.      Completed GI workup as detailed above.  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    Discussed with the patient that between the treatment for stopping the blood loss as detailed above, as well as the 2 doses of Venofer given in July, she is no longer anemic.  She also does not have iron deficiency.    She remains without anemia or iron  deficiency, follow-up as needed for the above.    For history of breast cancer, screening mammogram on 7/15/2024 BI-RADS 2, she will have follow-up for her breast imaging and for history of breast cancer surveillance in 12 months time.  Overdue for DEXA.    No orders of the defined types were placed in this encounter.    MDM low risk       Results From Past 48 Hours:  Recent Results (from the past 48 hours)   CBC With Differential With Platelet    Collection Time: 04/15/25 10:45 AM   Result Value Ref Range    WBC 6.2 4.0 - 11.0 x10(3) uL    RBC 4.35 3.80 - 5.30 x10(6)uL    HGB 14.4 12.0 - 16.0 g/dL    HCT 41.4 35.0 - 48.0 %    MCV 95.2 80.0 - 100.0 fL    MCH 33.1 26.0 - 34.0 pg    MCHC 34.8 31.0 - 37.0 g/dL    RDW-SD 44.6 35.1 - 46.3 fL    RDW 12.6 11.0 - 15.0 %    .0 150.0 - 450.0 10(3)uL    Neutrophil Absolute Prelim 3.51 1.50 - 7.70 x10 (3) uL    Neutrophil Absolute 3.51 1.50 - 7.70 x10(3) uL    Lymphocyte Absolute 1.76 1.00 - 4.00 x10(3) uL    Monocyte Absolute 0.70 0.10 - 1.00 x10(3) uL    Eosinophil Absolute 0.18 0.00 - 0.70 x10(3) uL    Basophil Absolute 0.04 0.00 - 0.20 x10(3) uL    Immature Granulocyte Absolute 0.02 0.00 - 1.00 x10(3) uL    Neutrophil % 56.6 %    Lymphocyte % 28.3 %    Monocyte % 11.3 %    Eosinophil % 2.9 %    Basophil % 0.6 %    Immature Granulocyte % 0.3 %   Ferritin    Collection Time: 04/15/25 10:45 AM   Result Value Ref Range    Ferritin 90 50 - 306 ng/mL   Iron And Tibc    Collection Time: 04/15/25 10:45 AM   Result Value Ref Range    Iron 104 50 - 170 ug/dL    Transferrin 191 (L) 250 - 380 mg/dL    Total Iron Binding Capacity 264 250 - 425 ug/dL    % Saturation 39 15 - 50 %       Imaging & Referrals:  [facility] GLENIS 2D+3D SCREENING BILAT (CPT=77067/66883)   No orders of the defined types were placed in this encounter.    Component      Latest Ref Rng 10/10/2024 4/15/2025   WBC      4.0 - 11.0 x10(3) uL 6.0  6.2    RBC      3.80 - 5.30 x10(6)uL 4.36  4.35    Hemoglobin      12.0 -  16.0 g/dL 14.3  14.4    Hematocrit      35.0 - 48.0 % 39.6  41.4    MCV      80.0 - 100.0 fL 90.8  95.2    MCH      26.0 - 34.0 pg 32.8  33.1    MCHC      31.0 - 37.0 g/dL 36.1  34.8    RDW-SD      35.1 - 46.3 fL 49.0 (H)  44.6    RDW      11.0 - 15.0 % 14.6  12.6    Platelet Count      150.0 - 450.0 10(3)uL 319.0  260.0    Prelim Neutrophil Abs      1.50 - 7.70 x10 (3) uL 3.83  3.51    Neutrophils Absolute      1.50 - 7.70 x10(3) uL 3.83  3.51    Lymphocytes Absolute      1.00 - 4.00 x10(3) uL 1.35  1.76    Monocytes Absolute      0.10 - 1.00 x10(3) uL 0.67  0.70    Eosinophils Absolute      0.00 - 0.70 x10(3) uL 0.10  0.18    Basophils Absolute      0.00 - 0.20 x10(3) uL 0.03  0.04    Immature Granulocyte Absolute      0.00 - 1.00 x10(3) uL 0.02  0.02    Neutrophils %      % 63.8  56.6    Lymphocytes %      % 22.5  28.3    Monocytes %      % 11.2  11.3    Eosinophils %      % 1.7  2.9    Basophils %      % 0.5  0.6    Immature Granulocyte %      % 0.3  0.3    Iron, Serum      50 - 170 ug/dL 92  104    Transferrin      250 - 380 mg/dL 186 (L)  191 (L)    Iron Bind.Cap.(TIBC)      250 - 425 ug/dL 277  264    Iron Saturation      15 - 50 % 33  39    FERRITIN      50 - 306 ng/mL 127  90

## 2025-04-24 NOTE — ED INITIAL ASSESSMENT (HPI)
Patient reports fall off chair when standing to take clock off wall. Patient reports left wrist pain. Denies hitting head. Denies blood thinners. Arrives with velcro splint.    Physical Therapy Evaluation    Visit Type: Initial Evaluation- Daily Treatment Note  Visit: 1  Referring Provider: Luma Mcfarlane PA-C  Medical Diagnosis (from order): M17.12 - Osteoarthritis of left knee, unspecified osteoarthritis type   Treatment Diagnosis: left knee - increased pain/symptoms, impaired posture, impaired range of motion, impaired strength and impaired body mechanics.  Onset  - Date of onset: 4/12/2025  Patient alert and oriented X3.  Chart reviewed at time of initial evaluation (relevant co-morbidities, allergies, tests and medication listed):   unremarkable  Reviewed and signed.  - Diagnostic tests reviewed: X-Ray    SUBJECTIVE                                                                                                               Patient reports that her knee pain has significantly improved since this started about 2 weeks ago with her fall. She notes that the pain is more lateral. She has been back to [employer] and gradually working back into her exercise routine. She was using a walker and cane, but nothing now.    Pain / Symptoms  - Location: left knee  - Quality / Description: ache, sore, stiff  - Alleviating Factors: avoiding movement in involved area, rest, ice, over-the-counter medication    Function:   Limitations / Exacerbation Factors:   - Patient reports pain and difficulty with function reported below.  - , standing tasks, bending/squatting/lifting, community distances, stairs  Prior Level of Function: pain free ADLs and IADLs. no limitation in involved extremity,    Patient Goals: decreased pain.    Prior treatment  - no therapies  - Discharged from hospital, home health, or skilled nursing facility in last 30 days: no  Home Environment   - Patient lives with: significant other  - Assistance available: as needed  - Denies 2 or more falls or an unexplained fall with injury in the last year.  - Feel safe at home / work / school: yes      OBJECTIVE                                                                                                                      Range of Motion (ROM)   (degrees unless noted; active unless noted; norms in ( ); negative=lacking to 0, positive=beyond 0)  Knee:   - Flexion (150):       Left:  105    - Extension (0-10):       Left:  3     Strength  (out of 5 unless noted, standard test position unless noted)   Knee:    - Flexion:         Left: 4-    - Extension:         Left: 4-               Ambulation / Gait  - Assistive device: none  - Distance (feet unless otherwise indicated): 15  - Assist Level: independent  - Surface: even  - Description: antalgic              Outcome/Assessments  Outcome Measures:   Lower Extremity Functional Scale: LEFS Calculated Total: 35 (0=extreme difficulty; 80=no difficulty) see flowsheet for additional documentation    Treatment     Therapeutic Exercise  Supine heel slides x 5  Clamshells x 5  Seated hamstring stretch x 30 secs    Continue with previous exercise routine and the gym.    Skilled input: verbal instruction/cues, tactile instruction/cues and posture correction    Writer verbally educated and received verbal consent for hand placement, positioning of patient, and techniques to be performed today from patient for therapist position for techniques and hand placement and palpation for techniques as described above and how they are pertinent to the patient's plan of care.  Home Exercise Program  *above indicates provided as part of home exercise program    Access Code: [email or web address]  URL: [email or web address]  Date: 04/24/2025  Prepared by: Becka Walters    Exercises  - Supine Heel Slide  - 1 x daily - 7 x weekly - 2 sets - 10 reps  - Clamshell  - 1 x daily - 7 x weekly - 2 sets - 10 reps  - Seated Hamstring Stretch  - 1 x daily - 7 x weekly - 1 sets - 3 reps - 30 secs hold      ASSESSMENT                                                                                                          63  year old patient has reported functional limitations listed above impacted by signs and symptoms consistent with treatment diagnosis below.  Treatment Diagnosis:   - Involved: left knee.  - Symptoms/impairments: increased pain/symptoms, impaired posture, impaired range of motion, impaired strength and impaired body mechanics.    Patient presents with signs and symptoms consistent with osteoarthritis of left knee. Slightly limited left knee flexion. PT instructed patient on home exercise program and educated on performing as tolerated, along with resuming activity at [employer]. Patient to continue to benefit from skilled therapy to work on stretching, strengthening, and functional mobility. Low complexity evaluation due to presentation and past medical history. Continue to follow plan of care and address goals. Patient is in agreement.    Prognosis: patient will benefit from skilled therapy  Rehabilitative potential is: good.  Predicted patient presentation: Low (stable) - Patient comorbidities and complexities, as defined above, will have little effect on progress for prescribed plan of care.  Education:   - Present and ready to learn: patient  - Results of above outlined education: Verbalizes understanding, Demonstrates understanding and Needs reinforcement    PLAN                                                                                                                         The following skilled interventions to be implemented to achieve goals listed below:  Neuromuscular Re-Education (49762)  Therapeutic Activity (26115)  Therapeutic Exercise (41413)  Manual Therapy (73955)  Gait Training (83926)  Electrical Stimulation Unattended (97045 or )  Heat/Cold (60326)  Ultrasound (43282)  Dry Needling  Electrical Stimulation Attended (37902)  Vasopneumatic Device (10792)    Frequency / Duration  2 times per month tapering as patient progresses for 3 months for an estimated total of 6 visits    Patient  involved in and agreed to plan of care and goals.  Patient given attendance agreement at time of initial evaluation.    Suggestions for next session as indicated: Progress per plan of care    Goals  Long Term Goals: to be met by end of plan of care  1. Patient will have increased knee range of motion to be able to watch her great nephew without difficulty.  2. Patient will have increased lower extremity strength to be able to navigate a flight of stairs.  3. Patient will demonstrate improved body mechanics to be able to ambulate independently with no gait deviations.  4. Patient will be independent with progressed and modified home exercise program.  5. Lower Extremity Functional Scale: Patient will score 44 or higher on The Lower Extremity Functional Scale to indicate a decreased level of difficulty with walking and moving around. (minimal clinically important difference: 9 points change)      Therapy procedure time and total treatment time can be found documented on the Time Entry flowsheet

## 2025-05-27 ENCOUNTER — HOSPITAL ENCOUNTER (OUTPATIENT)
Dept: BONE DENSITY | Facility: HOSPITAL | Age: 75
Discharge: HOME | End: 2025-05-27
Attending: INTERNAL MEDICINE
Payer: MEDICARE

## 2025-05-27 DIAGNOSIS — Z78.0 OSTEOPENIA AFTER MENOPAUSE: ICD-10-CM

## 2025-05-27 DIAGNOSIS — M85.80 OSTEOPENIA AFTER MENOPAUSE: ICD-10-CM

## 2025-05-27 PROCEDURE — 77080 DXA BONE DENSITY AXIAL: CPT | Performed by: INTERNAL MEDICINE

## 2025-07-17 ENCOUNTER — HOSPITAL ENCOUNTER (OUTPATIENT)
Dept: MAMMOGRAPHY | Facility: HOSPITAL | Age: 75
Discharge: HOME | End: 2025-07-17
Attending: INTERNAL MEDICINE
Payer: MEDICARE

## 2025-07-17 DIAGNOSIS — Z12.31 SCREENING MAMMOGRAM, ENCOUNTER FOR: ICD-10-CM

## 2025-07-17 PROCEDURE — 77063 BREAST TOMOSYNTHESIS BI: CPT | Performed by: INTERNAL MEDICINE

## 2025-07-17 PROCEDURE — 77067 SCR MAMMO BI INCL CAD: CPT | Performed by: INTERNAL MEDICINE

## (undated) DEVICE — LASSO POLYPECTOMY SNARE: Brand: LASSO

## (undated) DEVICE — YANKAUER,BULB TIP,W/O VENT,RIGID,STERILE: Brand: MEDLINE

## (undated) DEVICE — V2 SPECIMEN COLLECTION TRAY: Brand: NEPTUNE

## (undated) DEVICE — 60 ML SYRINGE REGULAR TIP: Brand: MONOJECT

## (undated) DEVICE — KIT CLEAN ENDOKIT 1.1OZ GOWNX2

## (undated) DEVICE — NEEDLE CONTRAST INTERJECT 25G

## (undated) DEVICE — YANKAUER SUCTION INSTRUMENT NO CONTROL VENT, BULB TIP, CLEAR: Brand: YANKAUER

## (undated) DEVICE — Device: Brand: DUAL NARE NASAL CANNULAE FEMALE LUER CON 7FT O2 TUBE

## (undated) DEVICE — CONMED SCOPE SAVER BITE BLOCK, 20X27 MM: Brand: SCOPE SAVER

## (undated) DEVICE — MEDI-VAC NON-CONDUCTIVE SUCTION TUBING: Brand: CARDINAL HEALTH

## (undated) DEVICE — STERILE LATEX POWDER-FREE SURGICAL GLOVESWITH NITRILE COATING: Brand: PROTEXIS

## (undated) DEVICE — GIJAW SINGLE-USE BIOPSY FORCEPS WITH NEEDLE: Brand: GIJAW

## (undated) DEVICE — FIAPC® PROBE W/ FILTER 2200 A OD 2.3MM/6.9FR; L 2.2M/7.2FT: Brand: ERBE

## (undated) DEVICE — FORCEP RADIAL JAW 4

## (undated) DEVICE — CO2 CANNULA,SSOFT,ADLT,7O2,4CO2,FEMALE: Brand: MEDLINE

## (undated) DEVICE — REM POLYHESIVE ADULT PATIENT RETURN ELECTRODE: Brand: VALLEYLAB

## (undated) DEVICE — SYRINGE, LUER SLIP, STERILE, 60ML: Brand: MEDLINE

## (undated) DEVICE — KIT ENDO ORCAPOD 160/180/190

## (undated) DEVICE — ELEVIEW 10ML AMPOULES SNGL USE

## (undated) DEVICE — MEDI-VAC NON-CONDUCTIVE SUCTION TUBING 6MM X 1.8M (6FT.) L: Brand: CARDINAL HEALTH

## (undated) DEVICE — CAPSULE ENDOSCP 11.4X26.2MM BIOCOMPATIBLE

## (undated) NOTE — MR AVS SNAPSHOT
[address] [place], [place] [id number]  [address] [facility] [address] [phone]               Thank you for choosing us for your health care visit with Wilma Devries MD.  We are glad to serve you and happy to provide you with this summary medication should be held 14 days prior to the procedure or at the time of scheduling/screening. Inform the department if you have any allergies to latex.   If Valium is prescribed by your surgeon, make sure there is someone to drive you home the day of t You can access your MyChart to more actively manage your health care and view more details from this visit by going to [email or web address].   If you've recently had a stay at the Hospital you can access your discharge instructions in [address] by [email or web address]

## (undated) NOTE — LETTER
[facility]  [address], [place], [place]    Authorization for Surgical Operation and Procedure                               I hereby authorize Rohit Sepulveda MD, my physician and his/her assistants (if applicable), which may include medical students, residents, and/or fellows, to perform the following surgical operation/ procedure and administer such anesthesia as may be determined necessary by my physician: Operation/Procedure name (s) COLONOSCOPY on Lashae Figueroa   2.   I recognize that during the surgical operation/procedure, unforeseen conditions may necessitate additional or different procedures than those listed above.  I, therefore, further authorize and request that the above-named surgeon, assistants, or designees perform such procedures as are, in their judgment, necessary and desirable.    3.   My surgeon/physician has discussed prior to my surgery the potential benefits, risks and side effects of this procedure; the likelihood of achieving goals; and potential problems that might occur during recuperation.  They also discussed reasonable alternatives to the procedure, including risks, benefits, and side effects related to the alternatives and risks related to not receiving this procedure.  I have had all my questions answered and I acknowledge that no guarantee has been made as to the result that may be obtained.    4.   Should the need arise during my operation/procedure, which includes change of level of care prior to discharge, I also consent to the administration of blood and/or blood products.  Further, I understand that despite careful testing and screening of blood or blood products by collecting agencies, I may still be subject to ill effects as a result of receiving a blood transfusion and/or blood products.  The following are some, but not all, of the potential risks that can occur: fever and allergic reactions, hemolytic reactions, transmission of  diseases such as Hepatitis, AIDS and Cytomegalovirus (CMV) and fluid overload.  In the event that I wish to have an autologous transfusion of my own blood, or a directed donor transfusion, I will discuss this with my physician.  Check only if Refusing Blood or Blood Products  I understand refusal of blood or blood products as deemed necessary by my physician may have serious consequences to my condition to include possible death. I hereby assume responsibility for my refusal and release the hospital, its personnel, and my physicians from any responsibility for the consequences of my refusal.    o  Refuse   5.   I authorize the use of any specimen, organs, tissues, body parts or foreign objects that may be removed from my body during the operation/procedure for diagnosis, research or teaching purposes and their subsequent disposal by hospital authorities.  I also authorize the release of specimen test results and/or written reports to my treating physician on the hospital medical staff or other referring or consulting physicians involved in my care, at the discretion of the Pathologist or my treating physician.    6.   I consent to the photographing or videotaping of the operations or procedures to be performed, including appropriate portions of my body for medical, scientific, or educational purposes, provided my identity is not revealed by the pictures or by descriptive texts accompanying them.  If the procedure has been photographed/videotaped, the surgeon will obtain the original picture, image, videotape or CD.  The hospital will not be responsible for storage, release or maintenance of the picture, image, tape or CD.    7.   I consent to the presence of a  or observers in the operating room as deemed necessary by my physician or their designees.    8.   I recognize that in the event my procedure results in extended X-Ray/fluoroscopy time, I may develop a skin reaction.    9. If I have a Do Not  Attempt Resuscitation (DNAR) order in place, that status will be suspended while in the operating room, procedural suite, and during the recovery period unless otherwise explicitly stated by me (or a person authorized to consent on my behalf). The surgeon or my attending physician will determine when the applicable recovery period ends for purposes of reinstating the DNAR order.  10. Patients having a sterilization procedure: I understand that if the procedure is successful the results will be permanent and it will therefore be impossible for me to inseminate, conceive, or bear children.  I also understand that the procedure is intended to result in sterility, although the result has not been guaranteed.   11. I acknowledge that my physician has explained sedation/analgesia administration to me including the risk and benefits I consent to the administration of sedation/analgesia as may be necessary or desirable in the judgment of my physician.    I CERTIFY THAT I HAVE READ AND FULLY UNDERSTAND THE ABOVE CONSENT TO OPERATION and/or OTHER PROCEDURE.     ____________________________________  _________________________________        ______________________________  Signature of Patient    Signature of Responsible Person                Printed Name of Responsible Person                                      ____________________________________  _____________________________                ________________________________  Signature of Witness        Date  Time         Relationship to Patient    STATEMENT OF PHYSICIAN My signature below affirms that prior to the time of the procedure; I have explained to the patient and/or his/her legal representative, the risks and benefits involved in the proposed treatment and any reasonable alternative to the proposed treatment. I have also explained the risks and benefits involved in refusal of the proposed treatment and alternatives to the proposed treatment and have answered the  patient's questions. If I have a significant financial interest in a co-management agreement or a significant financial interest in any product or implant, or other significant relationship used in this procedure/surgery, I have disclosed this and had a discussion with my patient.     _____________________________________________________              _____________________________  (Signature of Physician)                                                                                         (Date)                                   (Time)  Patient Name: Lashae Figueroa      : 1950      Printed: 2024     Medical Record #: [id number]                                      Page 1 of 1

## (undated) NOTE — LETTER
[address]  Authorization for Invasive Procedure                                                                                           I hereby authorize Abdiaziz Mccray MD, my physician and his/her assistants (if applicable), which may include medical students, residents, and/or fellows, to perform the following surgical operation/ procedure and administer such anesthesia as may be determined necessary by my physician: Operation/Procedure name (s) COLONOSCOPY / ESOPHAGOGASTRODUODENOSCOPY on Sindy [facility]   2. I recognize that during the surgical operation/procedure, unforeseen conditions may necessitate additional or different procedures than those listed above. I, therefore, further authorize and request that the above-named surgeon, assistants, or designees perform such procedures as are, in their judgment, necessary and desirable. 3.   My surgeon/physician has discussed prior to my surgery the potential benefits, risks and side effects of this procedure; the likelihood of achieving goals; and potential problems that might occur during recuperation. They also discussed reasonable alternatives to the procedure, including risks, benefits, and side effects related to the alternatives and risks related to not receiving this procedure. I have had all my questions answered and I acknowledge that no guarantee has been made as to the result that may be obtained. 4.   Should the need arise during my operation/procedure, which includes change of level of care prior to discharge, I also consent to the administration of blood and/or blood products. Further, I understand that despite careful testing and screening of blood or blood products by collecting agencies, I may still be subject to ill effects as a result of receiving a blood transfusion and/or blood products.   The following are some, but not all, of the potential risks that can occur: fever and allergic reactions, hemolytic reactions, transmission of diseases such as Hepatitis, AIDS and Cytomegalovirus (CMV) and fluid overload. In the event that I wish to have an autologous transfusion of my own blood, or a directed donor transfusion, I will discuss this with my physician. Check only if Refusing Blood or Blood Products  I understand refusal of blood or blood products as deemed necessary by my physician may have serious consequences to my condition to include possible death. I hereby assume responsibility for my refusal and release the hospital, its personnel, and my physicians from any responsibility for the consequences of my refusal.    o  Refuse   5. I authorize the use of any specimen, organs, tissues, body parts or foreign objects that may be removed from my body during the operation/procedure for diagnosis, research or teaching purposes and their subsequent disposal by hospital authorities. I also authorize the release of specimen test results and/or written reports to my treating physician on the hospital medical staff or other referring or consulting physicians involved in my care, at the discretion of the Pathologist or my treating physician. 6.   I consent to the photographing or videotaping of the operations or procedures to be performed, including appropriate portions of my body for medical, scientific, or educational purposes, provided my identity is not revealed by the pictures or by descriptive texts accompanying them. If the procedure has been photographed/videotaped, the surgeon will obtain the original picture, image, videotape or CD. The hospital will not be responsible for storage, release or maintenance of the picture, image, tape or CD.    7.   I consent to the presence of a  or observers in the operating room as deemed necessary by my physician or their designees.     8.   I recognize that in the event my procedure results in extended X-Ray/fluoroscopy time, I may develop a skin reaction. 9. If I have a Do Not Attempt Resuscitation (DNAR) order in place, that status will be suspended while in the operating room, procedural suite, and during the recovery period unless otherwise explicitly stated by me (or a person authorized to consent on my behalf). The surgeon or my attending physician will determine when the applicable recovery period ends for purposes of reinstating the DNAR order. 10. Patients having a sterilization procedure: I understand that if the procedure is successful the results will be permanent and it will therefore be impossible for me to inseminate, conceive, or bear children. I also understand that the procedure is intended to result in sterility, although the result has not been guaranteed. 11. I acknowledge that my physician has explained sedation/analgesia administration to me including the risk and benefits I consent to the administration of sedation/analgesia as may be necessary or desirable in the judgment of my physician. I CERTIFY THAT I HAVE READ AND FULLY UNDERSTAND THE ABOVE CONSENT TO OPERATION and/or OTHER PROCEDURE.     _________________________________________ _________________________________     ___________________________________  Signature of Patient     Signature of Responsible Person                   Printed Name of Responsible Person                              _________________________________________ ______________________________        ___________________________________  Signature of Witness         Date  Time         Relationship to Patient    STATEMENT OF PHYSICIAN My signature below affirms that prior to the time of the procedure; I have explained to the patient and/or his/her legal representative, the risks and benefits involved in the proposed treatment and any reasonable alternative to the proposed treatment.  I have also explained the risks and benefits involved in refusal of the proposed treatment and alternatives to the proposed treatment and have answered the patient's questions.  If I have a significant financial interest in a co-management agreement or a significant financial interest in any product or implant, or other significant relationship used in this procedure/surgery, I have disclosed this and had a discussion with my patient.     _______________________________________________________________ _____________________________  Christine Knowles Physician)                                                                                         (Date)                                   (Time)  Patient Name: Stanford Prado    : 1950   Printed: 2023      Medical Record #: [id number]                                              Page 1 of 1

## (undated) NOTE — MR AVS SNAPSHOT
[address] [address], [address] [facility] [address] [phone]               Thank you for choosing us for your health care visit with Cherylene Ormond.   We are glad to serve you and happy to provide you with this summary of yo Demerol [Meperidine] Other (See Comments)    Therapeutic failure    Ibuprofen Other (See Comments)    Rofecoxib Anxiety    Rash Away  [Sensi-Care Protective Barrier] Anaphylaxis    Vioxx [Rofecoxib] Other (See Comments)                   Current Medicatio

## (undated) NOTE — LETTER
[facility]  Administration of Anesthesia  I, Lashae Figueroa agree to be cared for by a physician anesthesiologist alone and/or with a nurse anesthetist, who is specially trained to monitor me and give me medicine to put me to sleep or keep me comfortable during my procedure    I understand that my anesthesiologist and/or anesthetist is not an employee or agent of [facility] or [employer]. He or she works for [facility]    As the patient asking for anesthesia services, I agree to:  Allow the anesthesiologist (anesthesia doctor) to give me medicine and do additional procedures as necessary. Some examples are: Starting or using an “IV” to give me medicine, fluids or blood during my procedure, and having a breathing tube placed to help me breathe when I’m asleep (intubation). In the event that my heart stops working properly, I understand that my anesthesiologist will make every effort to sustain my life, unless otherwise directed by [facility] Do Not Resuscitate documents.  Tell my anesthesia doctor before my procedure:  If I am pregnant.  The last time that I ate or drank.  iii. All of the medicines I take (including prescriptions, herbal supplements, and pills I can buy without a prescription (including street drugs/illegal medications). Failure to inform my anesthesiologist about these medicines may increase my risk of anesthetic complications.  iv.If I am allergic to anything or have had a reaction to anesthesia before.  I understand how the anesthesia medicine will help me (benefits).  I understand that with any type of anesthesia medicine there are risks:  The most common risks are: nausea, vomiting, sore throat, muscle soreness, damage to my eyes, mouth, or teeth (from breathing tube placement).  Rare risks include: remembering what happened during my procedure, allergic reactions to medications, injury to my airway, heart, lungs, vision, nerves, or  muscles and in extremely rare instances death.  My doctor has explained to me other choices available to me for my care (alternatives).  Pregnant Patients (“epidural”):  I understand that the risks of having an epidural (medicine given into my back to help control pain during labor), include itching, low blood pressure, difficulty urinating, headache or slowing of the baby’s heart. Very rare risks include infection, bleeding, seizure, irregular heart rhythms and nerve injury.  Regional Anesthesia (“spinal”, “epidural”, & “nerve blocks”):  I understand that rare but potential complications include headache, bleeding, infection, seizure, irregular heart rhythms, and nerve injury.    _____________________________________________________________________________  Patient (or Representative) Signature/Relationship to Patient  Date   Time    _____________________________________________________________________________   Name (if used)    Language/Organization   Time    _____________________________________________________________________________  Nurse Anesthetist Signature     Date   Time  _____________________________________________________________________________  Anesthesiologist Signature     Date   Time  I have discussed the procedure and information above with the patient (or patient’s representative) and answered their questions. The patient or their representative has agreed to have anesthesia services.    _____________________________________________________________________________  Witness        Date   Time  I have verified that the signature is that of the patient or patient’s representative, and that it was signed before the procedure  Patient Name: Lashae Figueroa     : 1950                 Printed: 2024 at 2:25 PM    Medical Record #: [id number]                                            Page 1 of 1  ----------ANESTHESIA CONSENT----------

## (undated) NOTE — LETTER
[facility]  [address], [place], [place]    Authorization for Surgical Operation and Procedure                               I hereby authorize Rohit Sepulveda MD, my physician and his/her assistants (if applicable), which may include medical students, residents, and/or fellows, to perform the following surgical operation/ procedure and administer such anesthesia as may be determined necessary by my physician: Operation/Procedure name (s) ESOPHAGOGASTRODUODENOSCOPY on Lashae Figueroa   2.   I recognize that during the surgical operation/procedure, unforeseen conditions may necessitate additional or different procedures than those listed above.  I, therefore, further authorize and request that the above-named surgeon, assistants, or designees perform such procedures as are, in their judgment, necessary and desirable.    3.   My surgeon/physician has discussed prior to my surgery the potential benefits, risks and side effects of this procedure; the likelihood of achieving goals; and potential problems that might occur during recuperation.  They also discussed reasonable alternatives to the procedure, including risks, benefits, and side effects related to the alternatives and risks related to not receiving this procedure.  I have had all my questions answered and I acknowledge that no guarantee has been made as to the result that may be obtained.    4.   Should the need arise during my operation/procedure, which includes change of level of care prior to discharge, I also consent to the administration of blood and/or blood products.  Further, I understand that despite careful testing and screening of blood or blood products by collecting agencies, I may still be subject to ill effects as a result of receiving a blood transfusion and/or blood products.  The following are some, but not all, of the potential risks that can occur: fever and allergic reactions, hemolytic reactions,  transmission of diseases such as Hepatitis, AIDS and Cytomegalovirus (CMV) and fluid overload.  In the event that I wish to have an autologous transfusion of my own blood, or a directed donor transfusion, I will discuss this with my physician.  Check only if Refusing Blood or Blood Products  I understand refusal of blood or blood products as deemed necessary by my physician may have serious consequences to my condition to include possible death. I hereby assume responsibility for my refusal and release the hospital, its personnel, and my physicians from any responsibility for the consequences of my refusal.    o  Refuse   5.   I authorize the use of any specimen, organs, tissues, body parts or foreign objects that may be removed from my body during the operation/procedure for diagnosis, research or teaching purposes and their subsequent disposal by hospital authorities.  I also authorize the release of specimen test results and/or written reports to my treating physician on the hospital medical staff or other referring or consulting physicians involved in my care, at the discretion of the Pathologist or my treating physician.    6.   I consent to the photographing or videotaping of the operations or procedures to be performed, including appropriate portions of my body for medical, scientific, or educational purposes, provided my identity is not revealed by the pictures or by descriptive texts accompanying them.  If the procedure has been photographed/videotaped, the surgeon will obtain the original picture, image, videotape or CD.  The hospital will not be responsible for storage, release or maintenance of the picture, image, tape or CD.    7.   I consent to the presence of a  or observers in the operating room as deemed necessary by my physician or their designees.    8.   I recognize that in the event my procedure results in extended X-Ray/fluoroscopy time, I may develop a skin reaction.    9. If  I have a Do Not Attempt Resuscitation (DNAR) order in place, that status will be suspended while in the operating room, procedural suite, and during the recovery period unless otherwise explicitly stated by me (or a person authorized to consent on my behalf). The surgeon or my attending physician will determine when the applicable recovery period ends for purposes of reinstating the DNAR order.  10. Patients having a sterilization procedure: I understand that if the procedure is successful the results will be permanent and it will therefore be impossible for me to inseminate, conceive, or bear children.  I also understand that the procedure is intended to result in sterility, although the result has not been guaranteed.   11. I acknowledge that my physician has explained sedation/analgesia administration to me including the risk and benefits I consent to the administration of sedation/analgesia as may be necessary or desirable in the judgment of my physician.    I CERTIFY THAT I HAVE READ AND FULLY UNDERSTAND THE ABOVE CONSENT TO OPERATION and/or OTHER PROCEDURE.     ____________________________________  _________________________________        ______________________________  Signature of Patient    Signature of Responsible Person                Printed Name of Responsible Person                                      ____________________________________  _____________________________                ________________________________  Signature of Witness        Date  Time         Relationship to Patient    STATEMENT OF PHYSICIAN My signature below affirms that prior to the time of the procedure; I have explained to the patient and/or his/her legal representative, the risks and benefits involved in the proposed treatment and any reasonable alternative to the proposed treatment. I have also explained the risks and benefits involved in refusal of the proposed treatment and alternatives to the proposed treatment and have  answered the patient's questions. If I have a significant financial interest in a co-management agreement or a significant financial interest in any product or implant, or other significant relationship used in this procedure/surgery, I have disclosed this and had a discussion with my patient.     _____________________________________________________              _____________________________  (Signature of Physician)                                                                                         (Date)                                   (Time)  Patient Name: Lashae Figueroa      : 1950      Printed: 2024     Medical Record #: [id number]                                      Page 1 of 1

## (undated) NOTE — Clinical Note
2017      Patient: Anna Rosen  : 1950 Visit date: 2017    Dear Olga Han,      I examined your patient in consultation today.     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

## (undated) NOTE — LETTER
[facility]  Administration of Anesthesia  I, Lashae Figueroa agree to be cared for by a physician anesthesiologist alone and/or with a nurse anesthetist, who is specially trained to monitor me and give me medicine to put me to sleep or keep me comfortable during my procedure    I understand that my anesthesiologist and/or anesthetist is not an employee or agent of [facility] or [employer]. He or she works for [facility] [facility]    As the patient asking for anesthesia services, I agree to:  Allow the anesthesiologist (anesthesia doctor) to give me medicine and do additional procedures as necessary. Some examples are: Starting or using an “IV” to give me medicine, fluids or blood during my procedure, and having a breathing tube placed to help me breathe when I’m asleep (intubation). In the event that my heart stops working properly, I understand that my anesthesiologist will make every effort to sustain my life, unless otherwise directed by [facility] Do Not Resuscitate documents.  Tell my anesthesia doctor before my procedure:  If I am pregnant.  The last time that I ate or drank.  iii. All of the medicines I take (including prescriptions, herbal supplements, and pills I can buy without a prescription (including street drugs/illegal medications). Failure to inform my anesthesiologist about these medicines may increase my risk of anesthetic complications.  iv.If I am allergic to anything or have had a reaction to anesthesia before.  I understand how the anesthesia medicine will help me (benefits).  I understand that with any type of anesthesia medicine there are risks:  The most common risks are: nausea, vomiting, sore throat, muscle soreness, damage to my eyes, mouth, or teeth (from breathing tube placement).  Rare risks include: remembering what happened during my procedure, allergic reactions to medications, injury to my airway, heart, lungs, vision, nerves, or  muscles and in extremely rare instances death.  My doctor has explained to me other choices available to me for my care (alternatives).  Pregnant Patients (“epidural”):  I understand that the risks of having an epidural (medicine given into my back to help control pain during labor), include itching, low blood pressure, difficulty urinating, headache or slowing of the baby’s heart. Very rare risks include infection, bleeding, seizure, irregular heart rhythms and nerve injury.  Regional Anesthesia (“spinal”, “epidural”, & “nerve blocks”):  I understand that rare but potential complications include headache, bleeding, infection, seizure, irregular heart rhythms, and nerve injury.    _____________________________________________________________________________  Patient (or Representative) Signature/Relationship to Patient  Date   Time    _____________________________________________________________________________   Name (if used)    Language/Organization   Time    _____________________________________________________________________________  Nurse Anesthetist Signature     Date   Time  _____________________________________________________________________________  Anesthesiologist Signature     Date   Time  I have discussed the procedure and information above with the patient (or patient’s representative) and answered their questions. The patient or their representative has agreed to have anesthesia services.    _____________________________________________________________________________  Witness        Date   Time  I have verified that the signature is that of the patient or patient’s representative, and that it was signed before the procedure  Patient Name: Lashae Figueroa     : 1950                 Printed: 2024 at 8:39 AM    Medical Record #: [id number]                                            Page 1 of 1  ----------ANESTHESIA CONSENT----------

## (undated) NOTE — LETTER
2/2/2024    Lashae Figueroa        [address] [place] [id number]            Dear Lashae Figueroa,      Our records indicate that you are due for an appointment for an EGD or Upper Endoscopy with Rohit Sepulveda MD. Our doctors are booking out about 3-6 months in advance for procedures.     Please call our office to schedule a phone screening appointment to plan for the procedure(s).   Your medical well-being is important to us.    If your insurance requires a referral, please call your primary care office to request one.      Thank you,      The Physicians and Staff at [facility]